# Patient Record
Sex: MALE | Race: WHITE | NOT HISPANIC OR LATINO | Employment: OTHER | URBAN - METROPOLITAN AREA
[De-identification: names, ages, dates, MRNs, and addresses within clinical notes are randomized per-mention and may not be internally consistent; named-entity substitution may affect disease eponyms.]

---

## 2022-11-02 NOTE — TELEPHONE ENCOUNTER
Action 11/2/22 MV 11.06am   Action Taken 1) EMG request faxed to   2) imaging request faxed to , [facility] [place], [facility] and [facility] Fax # [phone]  Tracking # [id number]    11/11/22 MV 12.23pm  All images resolved EXCEPT for CT Head from [facility]. Still need EMG report also    12/7/22 MV 1.54pm  EMG request faxed to     12/20/22 MV 2.30pm  2nd request faxed to  for EMG    12/23/22 MV 1.58pm  EMG rec'd and sent to scanning     Action 11/4/2022 RM   Action Taken Image received via disk from [facility] took to 4n for processing.         RECORDS RECEIVED FROM: self   REASON FOR VISIT: neuropathy    Date of Appt: 1/25/23   NOTES (FOR ALL VISITS) STATUS DETAILS   OFFICE NOTE from other specialist Care Everywhere Dr Bakari Carlson @ [facility] Med:  6/13/22 4/16/21    Dr Tobi Grande @ [facility] Neurology:  7/22/21  6/3/21  5/24/21  5/6/21    Dr Yesica Mijares @ [facility] Neurology:  5/11/18   OPERATIVE REPORT Care Everywhere [facility]:  10/25/21  DECOMPRESSION LEVELS: L2 TO S1 SIDE: BILATERAL   MEDICATION LIST Care Everywhere    IMAGING  (FOR ALL VISITS)     EMG Received [facility] Neurology:  12/5/19  10/11/18   MRI (HEAD, NECK, SPINE) Received [facility]:  MRI Lumbar Spine 4/29/22  MRI Cervical Spine 4/29/22  MRI Thoracic Spine 6/16/21  MRI Cervical Spine 6/16/21  MRI Lumbar Spine 5/9/21    [facility] [place]:  MRI Brain 5/21/18  MRA Head 5/21/18   CT (HEAD, NECK, SPINE) Received [facility]:  CT Head 12/30/21  CT Cervical Spine 12/30/21    [facility]:  CT Head 9/17/21

## 2023-01-25 ENCOUNTER — PRE VISIT (OUTPATIENT)
Dept: NEUROLOGY | Facility: CLINIC | Age: 73
End: 2023-01-25

## 2023-04-17 ENCOUNTER — TRANSCRIBE ORDERS (OUTPATIENT)
Dept: OTHER | Age: 73
End: 2023-04-17

## 2023-04-17 DIAGNOSIS — G62.9 PERIPHERAL NEUROPATHY: ICD-10-CM

## 2023-04-17 DIAGNOSIS — M54.16 CHRONIC LUMBAR RADICULOPATHY: Primary | ICD-10-CM

## 2023-06-09 ENCOUNTER — TRANSFERRED RECORDS (OUTPATIENT)
Dept: HEALTH INFORMATION MANAGEMENT | Facility: CLINIC | Age: 73
End: 2023-06-09
Payer: MEDICARE

## 2023-06-14 ENCOUNTER — TRANSCRIBE ORDERS (OUTPATIENT)
Dept: OTHER | Age: 73
End: 2023-06-14

## 2023-06-14 DIAGNOSIS — G62.9 NEUROPATHY: Primary | ICD-10-CM

## 2023-06-15 ENCOUNTER — TRANSCRIBE ORDERS (OUTPATIENT)
Dept: OTHER | Age: 73
End: 2023-06-15

## 2023-06-15 ENCOUNTER — MEDICAL CORRESPONDENCE (OUTPATIENT)
Dept: HEALTH INFORMATION MANAGEMENT | Facility: CLINIC | Age: 73
End: 2023-06-15
Payer: MEDICARE

## 2023-06-15 DIAGNOSIS — G62.9 PERIPHERAL NEUROPATHY: Primary | ICD-10-CM

## 2023-06-29 ENCOUNTER — TRANSFERRED RECORDS (OUTPATIENT)
Dept: HEALTH INFORMATION MANAGEMENT | Facility: CLINIC | Age: 73
End: 2023-06-29

## 2023-06-29 LAB
HBA1C MFR BLD: 5 %
TSH SERPL-ACNC: 2.69 MIU/L (ref 0.4–4.5)

## 2023-08-04 ENCOUNTER — TRANSFERRED RECORDS (OUTPATIENT)
Dept: HEALTH INFORMATION MANAGEMENT | Facility: CLINIC | Age: 73
End: 2023-08-04

## 2023-08-10 ENCOUNTER — TRANSFERRED RECORDS (OUTPATIENT)
Dept: HEALTH INFORMATION MANAGEMENT | Facility: CLINIC | Age: 73
End: 2023-08-10

## 2023-08-17 ENCOUNTER — MEDICAL CORRESPONDENCE (OUTPATIENT)
Dept: HEALTH INFORMATION MANAGEMENT | Facility: CLINIC | Age: 73
End: 2023-08-17

## 2023-10-06 ENCOUNTER — MEDICAL CORRESPONDENCE (OUTPATIENT)
Dept: HEALTH INFORMATION MANAGEMENT | Facility: CLINIC | Age: 73
End: 2023-10-06

## 2023-10-06 ENCOUNTER — OFFICE VISIT (OUTPATIENT)
Dept: NEUROLOGY | Facility: CLINIC | Age: 73
End: 2023-10-06
Attending: NURSE PRACTITIONER
Payer: COMMERCIAL

## 2023-10-06 VITALS
WEIGHT: 266 LBS | BODY MASS INDEX: 33.07 KG/M2 | DIASTOLIC BLOOD PRESSURE: 83 MMHG | SYSTOLIC BLOOD PRESSURE: 143 MMHG | HEART RATE: 58 BPM | HEIGHT: 75 IN

## 2023-10-06 DIAGNOSIS — R53.1 RAPIDLY PROGRESSIVE WEAKNESS: Primary | ICD-10-CM

## 2023-10-06 DIAGNOSIS — R29.2 AREFLEXIA: ICD-10-CM

## 2023-10-06 PROCEDURE — 99205 OFFICE O/P NEW HI 60 MIN: CPT | Performed by: PSYCHIATRY & NEUROLOGY

## 2023-10-06 RX ORDER — OMEGA-3S/DHA/EPA/FISH OIL/D3 300MG-1000
CAPSULE ORAL
COMMUNITY
Start: 2022-08-12

## 2023-10-06 RX ORDER — PROPRANOLOL HYDROCHLORIDE 20 MG/1
1 TABLET ORAL 2 TIMES DAILY
COMMUNITY
Start: 2022-10-03

## 2023-10-06 RX ORDER — OMEPRAZOLE/SODIUM BICARBONATE 20MG-1.1G
1 CAPSULE ORAL
COMMUNITY
Start: 2022-08-12

## 2023-10-06 RX ORDER — TRAZODONE HYDROCHLORIDE 50 MG/1
1 TABLET, FILM COATED ORAL AT BEDTIME
COMMUNITY
Start: 2022-08-12

## 2023-10-06 RX ORDER — MONTELUKAST SODIUM 10 MG/1
1 TABLET ORAL EVERY EVENING
COMMUNITY
Start: 2022-06-13

## 2023-10-06 RX ORDER — MULTIPLE VITAMINS W/ MINERALS TAB 9MG-400MCG
1 TAB ORAL DAILY
COMMUNITY

## 2023-10-06 RX ORDER — ESCITALOPRAM OXALATE 20 MG/1
20 TABLET ORAL
COMMUNITY
Start: 2022-08-12 | End: 2024-04-29

## 2023-10-06 RX ORDER — GABAPENTIN 300 MG/1
3 CAPSULE ORAL 2 TIMES DAILY
COMMUNITY
Start: 2022-10-03

## 2023-10-06 RX ORDER — GABAPENTIN 300 MG/1
600 CAPSULE ORAL
COMMUNITY
Start: 2022-06-13 | End: 2023-11-17

## 2023-10-06 RX ORDER — FUROSEMIDE 20 MG
1 TABLET ORAL DAILY
COMMUNITY
Start: 2022-10-03 | End: 2024-04-29

## 2023-10-06 RX ORDER — ATORVASTATIN CALCIUM 10 MG/1
1 TABLET, FILM COATED ORAL DAILY
COMMUNITY
Start: 2023-07-03

## 2023-10-06 RX ORDER — NAPHAZOLINE HCL/GLYCERIN 0.012-0.2%
2 DROPS OPHTHALMIC (EYE) DAILY
COMMUNITY

## 2023-10-06 RX ORDER — CETIRIZINE HYDROCHLORIDE 10 MG/1
10 TABLET ORAL DAILY
COMMUNITY

## 2023-10-06 NOTE — NURSING NOTE
"Santo Joyce is a 73 year old male who presents for:  Chief Complaint   Patient presents with    NEUROPATHY     Peripheral Neuropathy   Charcot-Kiley-Tooth disease        Initial Vitals:  BP (!) 143/83   Pulse 58   Ht 1.905 m (6' 3\")   Wt 120.7 kg (266 lb)   BMI 33.25 kg/m   Estimated body mass index is 33.25 kg/m  as calculated from the following:    Height as of this encounter: 1.905 m (6' 3\").    Weight as of this encounter: 120.7 kg (266 lb).. Body surface area is 2.53 meters squared. BP completed using cuff size: wrist cuff    Caesar King  "

## 2023-10-06 NOTE — PROGRESS NOTES
INITIAL NEUROLOGY CONSULTATION    DATE OF VISIT: 10/6/2023  MRN: [id number]  PATIENT NAME: Santo Joyce  YOB: 1950    REFERRING PROVIDER: Marilyn Read    Chief Complaint   Patient presents with    NEUROPATHY     Peripheral Neuropathy   Charcot-Kiley-Tooth disease     SUBJECTIVE:                                                      HPI:   Santo Joyce is a 73 year old male whom I have been asked by Marilyn Read, CNP, to see in consultation for neuropathy.  I note that he had an appointment in neuromuscular clinic with Dr. Sheikh earlier this year but was a no-show and then canceled his appointment here in our clinic in June.  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  Notes also indicate that he is on gabapentin.  B12 level was 811 earlier this year and his TSH was normal (4.2023).  He has additional history of bilateral pitting edema in the lower extremities.  Additional history of chronic lumbar radiculopathy, dyslipidemia, insomnia, sleep apnea and tremor.  He has had decompressive surgery of the lumbar spine in the past.  Transaminase levels were normal earlier this year.  He stopped his statin earlier this year for leg pain. Additional history of Lyme (treated with doxycycline). Family history of ALS in an uncle.     The only neurology note that I can find is a telemedicine visit from May 2021.  The patient reported rapidly progressive weakness in the legs.  He also reported several falls and radiating pain down the fronts of the legs for the previous 2 years.  Plan was for follow-up of a lumbar MRI and EMG.  His gabapentin was increased at that time.    Per the 2021 electromyogram report:  EMG REPORT.    CONCLUSION: Abnormal nerve conduction study (NCS) and EMG of Rt upper and bilateral lower extremities as described in the data sheet. The findings suggest the follows.    1. Severe bilateral tibial and right peroneal motor  neuropathy due to no obtainable responses were noted in these motor nerve studies.    2. Severe right median motor and sensory neuropathy where sensory response is not obtainable and there is likely superimposed carpal tunnel syndrome because of the significant slowing noted at the wrist level and distal to the wrist level which also prolonged when compared to the ulnar nerve distal latency.    3. Severe right ulnar motor and sensory neuropathy where sensory response is not obtainable and there is superimposed decrease nerve conduction velocity at the range of 20-21 m/sec at the inferior aspect of medial epicondyle and across the medial epicondyle regions. The findings suggest that there is likely superimposed right compressive ulnar neuropathy at the elbow level as well.    4. Severe right radial, median, ulnar sensory neuropathy and bilateral medial plantar sensory neuropathy because no obtainable responses were noted in the sensory nerve studies.    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 Of note, exact localization base on paraspinal muscle study is not possible due to the overlapping of enervations in these regions. Also, underling chronic neuropathy make localization difficult if not impossible.     He subsequently followed with orthopedics for the lumbar surgery.    Santo is here with  his SO, Rand.  They explained that he did have a more recent work-up through [facility].  Another EMG was completed this past summer and from that they were told he has neuropathy but otherwise the details are unclear.  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 Over the course of the past couple of years his balance has gotten worse. He has declined rapidly over the past couple of years, per Rand.  They clarified that he had 2 lumbar surgeries in the interim (most recent was a revision of the initial infusion: Dec 2022).  This helped from the standpoint of the thigh pain he was having but has not led to improvement in terms of his strength.     Strength did seem to decline more after he had COVID in August 2021.    He has trouble getting up from a chair. His legs give out from under him.   In terms of the hands, he does have some numbness in digits 4-5 in both hands. He has trouble with dexterity in the fingers.  No bladder or bowel symptoms.  He started to walk with a cane a while ago and he graduated to the walker about a year ago.  He did have a fall a few weeks ago and injured his left knee, tearing the meniscus.  He had a recent cortisone shot in the knee for this.    Santo thinks it may be exercise makes him feel weaker but rest does not help in terms of his strength.  No problems with double vision or neck weakness.  No problems with swallow.    He has not had any brain or upper spine imaging.  He has not undergone a CSF work-up.    Rand mentions that his previous neurologist did tell him that he does not have Parkinson's.    Josh did start with a new physical therapist just today.  He is not sure if therapy has been helpful in terms of his strength and mobility.    No past medical history on file.  No past surgical history on file.    atorvastatin (LIPITOR) 10 MG tablet, Take 1 tablet by mouth daily  cetirizine (ZYRTEC) 10 MG tablet, Take 10  "mg by mouth daily  CINNAMON PO,   escitalopram (LEXAPRO) 20 MG tablet, 20 mg  furosemide (LASIX) 20 MG tablet, Take 1 tablet by mouth daily  gabapentin (NEURONTIN) 300 MG capsule, Take 2 capsules by mouth 3 times daily  Glucosamine-Chondroitin 500-250 MG CAPS, Take 2 tablets by mouth daily  L-Lysine 1000 MG TABS, Take 1,000 mg by mouth  montelukast (SINGULAIR) 10 MG tablet, Take 1 tablet by mouth every evening  multivitamin w/minerals (MULTI-VITAMIN) tablet, Take 1 tablet by mouth daily  omeprazole-sodium bicarbonate  MG CAPS per capsule, Take 1 capsule by mouth daily before breakfast  Probiotic Product (SUPER PROBIOTIC) CAPS, Take 2 capsules by mouth daily  propranolol (INDERAL) 20 MG tablet, Take 1 tablet by mouth 3 times daily  traZODone (DESYREL) 50 MG tablet, Take 1 tablet by mouth at bedtime  Turmeric (QC TUMERIC COMPLEX PO),   Vitamin D3 (VITAMIN D) 10 MCG (400 UNIT) tablet, daily, 0 Refill(s), Type: Maintenance  ZINC METHIONATE PO, Take 1 capsule by mouth daily  gabapentin (NEURONTIN) 300 MG capsule, Take 600 mg by mouth (Patient not taking: Reported on 10/6/2023)    No current facility-administered medications on file prior to visit.    No Known Allergies     No data available          Social History     Tobacco Use    Smoking status: Former     Types: Cigarettes    Smokeless tobacco: Never   Substance Use Topics    Alcohol use: Yes     Comment: 1 drink once a month       REVIEW OF SYSTEMS:                                                      10-point review of systems is negative except as mentioned above in HPI.     EXAM:                                                      Physical Exam:   Vitals: BP (!) 143/83   Pulse 58   Ht 1.905 m (6' 3\")   Wt 120.7 kg (266 lb)   BMI 33.25 kg/m    BMI= Body mass index is 33.25 kg/m .  GENERAL: NAD.  HEENT: NC/AT.   CV: RRR. S1, S2.   NECK: No bruits.  PULM: Non-labored breathing.   EXTR: Mild pitting edema distal to mid calves.  Neurologic:  MENTAL STATUS: " Alert, attentive. Speech is fluent. Normal comprehension. Normal concentration. Adequate fund of knowledge.   CRANIAL NERVES: Discs technically difficult to visualize. Visual fields intact to confrontation. Pupils equally, round and reactive to light. Facial sensation and movement normal. EOM full. Hearing intact to conversation. Sternocleidomastoids and trapezius strength intact. Palate moves symmetrically. Tongue midline.  MOTOR: Strength appears to be full in the upper extremities.  Some slight weakness with hip flexion on the left. AB/ADduction strength is normal.  Slight weakness bilaterally with knee extension.  Knee flexion is good bilaterally.  Evidence of foot drop on the right and very slight weakness in dorsiflexion on the left.  His right leg is slightly thinner than the left (not new, per SO).  Some atrophy in the hands.  DTRs: Mute.    SENSATION: Normal light touch and pinprick in the hands except for some hypersensitivity in the right pinky.  Absent pinprick in the legs distal to the knees. Vibration: Absent at right ankle, severely diminished on the left (1 second).   COORDINATION: Slight tremor (L>R) otherwise normal finger nose finger. Finger tapping is slow, he cannot put his thumb and pinkys together.  Knee heel shin requires a great deal of effort bilaterally.  STATION AND GAIT: Cannot test Romberg or postural reflexes due to unsteadiness on his feet.  He cannot get up from the chair without using his arms.  Gait is wide-based, cautious and steppage on the right.  Left hand-dominant.    ASSESSMENT and PLAN:                                                      Assessment:     ICD-10-CM    1. Rapidly progressive weakness  R53.1 MR Brain w/o & w Contrast     MR Cervical Spine w/o & w Contrast     MR Thoracic Spine w/o & w Contrast      2. Peripheral neuropathy  G62.9 Adult Neurology  Referral      3. Areflexia  R29.2           Mr. Joyce is a pleasant 73-year-old man here for a second  opinion regarding progressive weakness/paresthesias.  He is areflexic and does have some variable weakness in the legs.  Possible demyelinating neuropathy and I do not have the recent EMG results nor the results of the additional work-up completed through [facility] so we have requested these. I would like to do central nervous system imaging since this has not been completed.  Additional work-up pending the results of his work-up this past summer.  I will notify the patient regarding next steps.  Santo and Rand expressed understanding and agreement with the plan.    Plan:  -- I would like to start with brain and cervical/thoracic spine imaging.  In the meantime we will get your records from [facility] and  -- Once I review these I will let you know what next steps I recommend.  -- Follow up in clinic when you return to [place] in the spring.   -- Continue to physical therapy in the meantime.    Total Time: 70 minutes were spent with the patient and in chart review/documentation (as described above in Assessment and Plan) /coordinating the care on date of service.    Deanne Monroe MD  Neurology    CC: GUERO Varghese software used in the dictation of this note.

## 2023-10-06 NOTE — LETTER
10/6/2023         RE: Santo Joyce  [address] [facility] [address]        Dear Colleague,    Thank you for referring your patient, Santo Joyce, to the [facility] [place]. Please see a copy of my visit note below.    INITIAL NEUROLOGY CONSULTATION    DATE OF VISIT: 10/6/2023  MRN: [id number]  PATIENT NAME: Santo Joyce  YOB: 1950    REFERRING PROVIDER: Marilyn Read    Chief Complaint   Patient presents with     NEUROPATHY     Peripheral Neuropathy   Charcot-Kiley-Tooth disease     SUBJECTIVE:                                                      HPI:   Santo Joyce is a 73 year old male whom I have been asked by Marilyn Read, CNP, to see in consultation for neuropathy.  I note that he had an appointment in neuromuscular clinic with Dr. Sheikh earlier this year but was a no-show and then canceled his appointment here in our clinic in June.  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  Notes also indicate that he is on gabapentin.  B12 level was 811 earlier this year and his TSH was normal (4.2023).  He has additional history of bilateral pitting edema in the lower extremities.  Additional history of chronic lumbar radiculopathy, dyslipidemia, insomnia, sleep apnea and tremor.  He has had decompressive surgery of the lumbar spine in the past.  Transaminase levels were normal earlier this year.  He stopped his statin earlier this year for leg pain. Additional history of Lyme (treated with doxycycline). Family history of ALS in an uncle.     The only neurology note that I can find is a telemedicine visit from May 2021.  The patient reported rapidly progressive weakness in the legs.  He also reported several falls and radiating pain down the fronts of the legs for the previous 2 years.  Plan was for follow-up of a lumbar MRI and EMG.  His gabapentin was increased at that time.    Per the 2021  electromyogram report:  EMG REPORT.    CONCLUSION: Abnormal nerve conduction study (NCS) and EMG of Rt upper and bilateral lower extremities as described in the data sheet. The findings suggest the follows.    1. Severe bilateral tibial and right peroneal motor neuropathy due to no obtainable responses were noted in these motor nerve studies.    2. Severe right median motor and sensory neuropathy where sensory response is not obtainable and there is likely superimposed carpal tunnel syndrome because of the significant slowing noted at the wrist level and distal to the wrist level which also prolonged when compared to the ulnar nerve distal latency.    3. Severe right ulnar motor and sensory neuropathy where sensory response is not obtainable and there is superimposed decrease nerve conduction velocity at the range of 20-21 m/sec at the inferior aspect of medial epicondyle and across the medial epicondyle regions. The findings suggest that there is likely superimposed right compressive ulnar neuropathy at the elbow level as well.    4. Severe right radial, median, ulnar sensory neuropathy and bilateral medial plantar sensory neuropathy because no obtainable responses were noted in the sensory nerve studies.    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 Of note, exact localization base on  paraspinal muscle study is not possible due to the overlapping of enervations in these regions. Also, underling chronic neuropathy make localization difficult if not impossible.     He subsequently followed with orthopedics for the lumbar surgery.    Santo is here with his SO, Rand.  They explained that he did have a more recent work-up through [facility].  Another EMG was completed this past summer and from that they were told he has neuropathy but otherwise the details are unclear.  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 Over the course of the past couple of years his balance has gotten worse. He has declined rapidly over the past couple of years, per Rand.  They clarified that he had 2 lumbar surgeries in the interim (most recent was a revision of the initial infusion: Dec 2022).  This helped from the standpoint of the thigh pain he was having but has not led to improvement in terms of his strength.     Strength did seem to decline more after he had COVID in August 2021.    He has trouble getting up from a chair. His legs give out from under him.   In terms of the hands, he does have some numbness in digits 4-5 in both hands. He has trouble with dexterity in the fingers.  No bladder or bowel symptoms.  He started to walk with a cane a while ago and he graduated to the walker about a year ago.  He did have a fall a few weeks ago and injured his left knee, tearing the meniscus.  He had a recent cortisone shot in the knee for this.    Santo thinks it may be exercise makes him feel weaker but rest does not help in terms of his strength.  No problems with double vision or neck weakness.  No problems with swallow.    He has not had any brain or upper spine imaging.  He has not undergone a CSF work-up.    Rand mentions that his previous neurologist did tell him that he does not have Parkinson's.    Josh did start with a new physical therapist just  today.  He is not sure if therapy has been helpful in terms of his strength and mobility.    No past medical history on file.  No past surgical history on file.    atorvastatin (LIPITOR) 10 MG tablet, Take 1 tablet by mouth daily  cetirizine (ZYRTEC) 10 MG tablet, Take 10 mg by mouth daily  CINNAMON PO,   escitalopram (LEXAPRO) 20 MG tablet, 20 mg  furosemide (LASIX) 20 MG tablet, Take 1 tablet by mouth daily  gabapentin (NEURONTIN) 300 MG capsule, Take 2 capsules by mouth 3 times daily  Glucosamine-Chondroitin 500-250 MG CAPS, Take 2 tablets by mouth daily  L-Lysine 1000 MG TABS, Take 1,000 mg by mouth  montelukast (SINGULAIR) 10 MG tablet, Take 1 tablet by mouth every evening  multivitamin w/minerals (MULTI-VITAMIN) tablet, Take 1 tablet by mouth daily  omeprazole-sodium bicarbonate  MG CAPS per capsule, Take 1 capsule by mouth daily before breakfast  Probiotic Product (SUPER PROBIOTIC) CAPS, Take 2 capsules by mouth daily  propranolol (INDERAL) 20 MG tablet, Take 1 tablet by mouth 3 times daily  traZODone (DESYREL) 50 MG tablet, Take 1 tablet by mouth at bedtime  Turmeric (QC TUMERIC COMPLEX PO),   Vitamin D3 (VITAMIN D) 10 MCG (400 UNIT) tablet, daily, 0 Refill(s), Type: Maintenance  ZINC METHIONATE PO, Take 1 capsule by mouth daily  gabapentin (NEURONTIN) 300 MG capsule, Take 600 mg by mouth (Patient not taking: Reported on 10/6/2023)    No current facility-administered medications on file prior to visit.    No Known Allergies     No data available          Social History     Tobacco Use     Smoking status: Former     Types: Cigarettes     Smokeless tobacco: Never   Substance Use Topics     Alcohol use: Yes     Comment: 1 drink once a month       REVIEW OF SYSTEMS:                                                      10-point review of systems is negative except as mentioned above in HPI.     EXAM:                                                      Physical Exam:   Vitals: BP (!) 143/83   Pulse 58    "Ht 1.905 m (6' 3\")   Wt 120.7 kg (266 lb)   BMI 33.25 kg/m    BMI= Body mass index is 33.25 kg/m .  GENERAL: NAD.  HEENT: NC/AT.   CV: RRR. S1, S2.   NECK: No bruits.  PULM: Non-labored breathing.   EXTR: Mild pitting edema distal to mid calves.  Neurologic:  MENTAL STATUS: Alert, attentive. Speech is fluent. Normal comprehension. Normal concentration. Adequate fund of knowledge.   CRANIAL NERVES: Discs technically difficult to visualize. Visual fields intact to confrontation. Pupils equally, round and reactive to light. Facial sensation and movement normal. EOM full. Hearing intact to conversation. Sternocleidomastoids and trapezius strength intact. Palate moves symmetrically. Tongue midline.  MOTOR: Strength appears to be full in the upper extremities.  Some slight weakness with hip flexion on the left. AB/ADduction strength is normal.  Slight weakness bilaterally with knee extension.  Knee flexion is good bilaterally.  Evidence of foot drop on the right and very slight weakness in dorsiflexion on the left.  His right leg is slightly thinner than the left (not new, per SO).  Some atrophy in the hands.  DTRs: Mute.    SENSATION: Normal light touch and pinprick in the hands except for some hypersensitivity in the right pinky.  Absent pinprick in the legs distal to the knees. Vibration: Absent at right ankle, severely diminished on the left (1 second).   COORDINATION: Slight tremor (L>R) otherwise normal finger nose finger. Finger tapping is slow, he cannot put his thumb and pinkys together.  Knee heel shin requires a great deal of effort bilaterally.  STATION AND GAIT: Cannot test Romberg or postural reflexes due to unsteadiness on his feet.  He cannot get up from the chair without using his arms.  Gait is wide-based, cautious and steppage on the right.  Left hand-dominant.    ASSESSMENT and PLAN:                                                      Assessment:     ICD-10-CM    1. Rapidly progressive weakness  " R53.1 MR Brain w/o & w Contrast     MR Cervical Spine w/o & w Contrast     MR Thoracic Spine w/o & w Contrast      2. Peripheral neuropathy  G62.9 Adult Neurology  Referral      3. Areflexia  R29.2           Mr. Joyce is a pleasant 73-year-old man here for a second opinion regarding progressive weakness/paresthesias.  He is areflexic and does have some variable weakness in the legs.  Possible demyelinating neuropathy and I do not have the recent EMG results nor the results of the additional work-up completed through [facility] so we have requested these. I would like to do central nervous system imaging since this has not been completed.  Additional work-up pending the results of his work-up this past summer.  I will notify the patient regarding next steps.  Santo and Rand expressed understanding and agreement with the plan.    Plan:  -- I would like to start with brain and cervical/thoracic spine imaging.  In the meantime we will get your records from [facility] and  -- Once I review these I will let you know what next steps I recommend.  -- Follow up in clinic when you return to [place] in the spring.   -- Continue to physical therapy in the meantime.    Total Time: 70 minutes were spent with the patient and in chart review/documentation (as described above in Assessment and Plan) /coordinating the care on date of service.    Deanne Monroe MD  Neurology    CC: GUERO Varghe[employer] software used in the dictation of this note.      Again, thank you for allowing me to participate in the care of your patient.        Sincerely,        Deanne Monroe MD

## 2023-10-27 ENCOUNTER — HOSPITAL ENCOUNTER (OUTPATIENT)
Dept: MRI IMAGING | Facility: HOSPITAL | Age: 73
Discharge: HOME | End: 2023-10-27
Attending: PSYCHIATRY & NEUROLOGY
Payer: MEDICARE

## 2023-10-27 DIAGNOSIS — R53.1 RAPIDLY PROGRESSIVE WEAKNESS: ICD-10-CM

## 2023-10-27 PROCEDURE — A9585 GADOBUTROL INJECTION: HCPCS | Mod: JZ | Performed by: PSYCHIATRY & NEUROLOGY

## 2023-10-27 PROCEDURE — 72157 MRI CHEST SPINE W/O & W/DYE: CPT

## 2023-10-27 PROCEDURE — 70553 MRI BRAIN STEM W/O & W/DYE: CPT

## 2023-10-27 PROCEDURE — 72156 MRI NECK SPINE W/O & W/DYE: CPT

## 2023-10-27 PROCEDURE — 255N000002 HC RX 255 OP 636: Mod: JZ | Performed by: PSYCHIATRY & NEUROLOGY

## 2023-10-27 RX ORDER — GADOBUTROL 604.72 MG/ML
0.1 INJECTION INTRAVENOUS ONCE
Status: COMPLETED | OUTPATIENT
Start: 2023-10-27 | End: 2023-10-27

## 2023-10-27 RX ADMIN — GADOBUTROL 12 ML: 604.72 INJECTION INTRAVENOUS at 18:35

## 2023-11-01 ENCOUNTER — TRANSFERRED RECORDS (OUTPATIENT)
Dept: HEALTH INFORMATION MANAGEMENT | Facility: CLINIC | Age: 73
End: 2023-11-01

## 2023-11-17 ENCOUNTER — HOSPITAL ENCOUNTER (OUTPATIENT)
Dept: RADIOLOGY | Facility: HOSPITAL | Age: 73
Discharge: HOME | End: 2023-11-17
Attending: NURSE PRACTITIONER | Admitting: NURSE PRACTITIONER
Payer: MEDICARE

## 2023-11-17 ENCOUNTER — OFFICE VISIT (OUTPATIENT)
Dept: PHYSICAL MEDICINE AND REHAB | Facility: CLINIC | Age: 73
End: 2023-11-17
Attending: PSYCHIATRY & NEUROLOGY
Payer: COMMERCIAL

## 2023-11-17 VITALS
WEIGHT: 262 LBS | BODY MASS INDEX: 32.58 KG/M2 | SYSTOLIC BLOOD PRESSURE: 153 MMHG | HEART RATE: 76 BPM | DIASTOLIC BLOOD PRESSURE: 70 MMHG | HEIGHT: 75 IN

## 2023-11-17 DIAGNOSIS — M43.12 SPONDYLOLISTHESIS OF CERVICAL REGION: ICD-10-CM

## 2023-11-17 DIAGNOSIS — M50.30 DDD (DEGENERATIVE DISC DISEASE), CERVICAL: ICD-10-CM

## 2023-11-17 DIAGNOSIS — M43.12 SPONDYLOLISTHESIS OF CERVICAL REGION: Primary | ICD-10-CM

## 2023-11-17 PROCEDURE — 72040 X-RAY EXAM NECK SPINE 2-3 VW: CPT

## 2023-11-17 PROCEDURE — 99204 OFFICE O/P NEW MOD 45 MIN: CPT | Performed by: NURSE PRACTITIONER

## 2023-11-17 ASSESSMENT — PAIN SCALES - GENERAL: PAINLEVEL: MILD PAIN (2)

## 2023-11-17 NOTE — LETTER
11/17/2023         RE: Santo Joyce  [address] [facility] [address]        Dear Colleague,    Thank you for referring your patient, Santo Joyce, to the [facility]ERY. Please see a copy of my visit note below.    ASSESSMENT: Santo Joyce is a 73 year old male presents for consultation at the request of PCP Bakari Carlson, who presents today for new patient evaluation of :     -Cervical radiculopathy    Patient has weakness in his hands, and sensory loss in his hands on exam.  He has chronic distal bilateral lower extremity weakness, which was present prior to his lumbar surgeries.  He is hoping to attempt to preserve his hand function.  We reviewed his cervical MRI study, which does show multilevel severe bilateral neural foraminal narrowing in the cervical spine.  We reviewed his thoracic MRI, which appears normal with wear and tear degenerative disc changes he reportedly had an EMG of both upper extremities in June of this year, however I do not have access to this report.  We we will contact [facility] and have these results pushed to our system for review.  His previous EMGs show carpal tunnel and ulnar neuropathy, as well as polyneuropathy.  I would anticipate a neurosurgery referral given his hand weakness and numbness, and recommended adding cervical flexion-extension x-rays today.  We will touch base after we have EMG results           No data to display                     Diagnoses and all orders for this visit:  Spondylolisthesis of cervical region  -     XR Cervical Spine Flex/Ext 2/3 Views; Future  DDD (degenerative disc disease), cervical  -     Spine  Referral       PLAN:  Reviewed spine anatomy and disease process. Discussed diagnosis and treatment options with the patient today. A shared decision making model was used. The patient's values and choices were respected. The following represents what was discussed and decided upon by the provider and the patient.      -DIAGNOSTIC TESTS:  Images were personally reviewed and interpreted and explained to patient today using spine model.   -- I ordered cervical flexion-extension x-rays    -PHYSICAL THERAPY:    -Patient is already in physical therapy, and will continue this  Discussed the importance of core strengthening, ROM, stretching exercises with the patient and how each of these entities is important in decreasing pain.  Explained to the patient that the purpose of physical therapy is to teach the patient a home exercise program.  These exercises need to be performed every day in order to decrease pain and prevent future occurrences of pain.    -MEDICATIONS:    -No medication changes were made today.  Agree with gabapentin for his hand numbness.  As he is not having much pain, would not add any additional medications to this plan  Discussed multiple medication options today with patient. Discussed risks, side effects, and proper use of medications. Patient verbalized understanding.    -INTERVENTIONS:    We did not discuss injections today    -PATIENT EDUCATION: Total time of 40 minutes, on the day of service, spent with the patient, reviewing the chart, placing orders, and documenting.   -Today we also discussed the issues related to the pros and cons of the current treatment plan.    -FOLLOW-UP:   Follow-up telephone call to review EMG and x-ray results    Advised patient to call the [facility] if symptoms worsen or if they develop red flag symptoms such as numbness, weakness, severe pain uncontrolled by current pain med regimen, or any new or worsening problems controlling bladder and bowel function.   ______________________________________________________________________    SUBJECTIVE:   Santo Joyce  is a 73 year old male history of depression, neuropathy, CMT and 2 previous lumbar spine surgeries who presents today for new patient evaluation of cervical radiculopathy    Patient was referred by neurology for further  evaluation of bilateral hand numbness, tingling in the setting of recent cervical MRI results.  Santo reports chronic numbness and tingling in both of his hands, particularly in the thumb and index finger, and fourth and fifth fingers.  He reports progressing weakness in his hands and also legs.  He denies any neck or lower back pain, radicular arm or leg pain, or changes in bowel or bladder control. He has been walking with a rolling walker for the last year or so.  He did have bilateral foot drop prior to his lumbar surgery. he has had lumbar surgery x2 and was cleared of any further surgical needs based on repeat lumbar imaging recently.  He is followed by neurology and was diagnosed with CMT. neurology team has discussed possible LP for further work-up, but this has not been pursued.  He was recommended to have MRI imaging of his brain, cervical spine, thoracic spine and has not been able to discuss results yet.  Neurology was reportedly waiting for EMG results to be pushed to the system as well.  He had a bilateral upper extremity EMG in June 2023 done at [facility]    He has not had any cervical spine surgery, injections, or chiropractic care  He has had 2 lumbar spine surgeries      -Treatment to Date:     -Medications:  gabapentin    Current Outpatient Medications   Medication     atorvastatin (LIPITOR) 10 MG tablet     cetirizine (ZYRTEC) 10 MG tablet     CINNAMON PO     escitalopram (LEXAPRO) 20 MG tablet     furosemide (LASIX) 20 MG tablet     gabapentin (NEURONTIN) 300 MG capsule     Glucosamine-Chondroitin 500-250 MG CAPS     L-Lysine 1000 MG TABS     montelukast (SINGULAIR) 10 MG tablet     multivitamin w/minerals (MULTI-VITAMIN) tablet     omeprazole-sodium bicarbonate  MG CAPS per capsule     Probiotic Product (SUPER PROBIOTIC) CAPS     propranolol (INDERAL) 20 MG tablet     traZODone (DESYREL) 50 MG tablet     Turmeric (QC TUMERIC COMPLEX PO)     Vitamin D3 (VITAMIN D) 10 MCG (400 UNIT) tablet  "    ZINC METHIONATE PO     No current facility-administered medications for this visit.       No Known Allergies    No past medical history on file.     There is no problem list on file for this patient.      No past surgical history on file.    No family history on file.    Reviewed past medical, surgical, and family history with patient found on new patient intake packet located in EMR Media tab.     SOCIAL HX: Non-smoker, no alcohol use, no heavy drinking, no recreational drug use    Oswestry (JAKY) Questionnaire:         No data to display                Neck Disability Index:       No data to display                   PHQ-2 Score:       10/6/2023    10:52 AM   PHQ-2 ( 1999 [employer])   Q1: Little interest or pleasure in doing things 0   Q2: Feeling down, depressed or hopeless 0   PHQ-2 Score 0          ROS: Positive for sexual dysfunction, ringing in ears, feet and leg swelling, color changes in hands and feet, shortness of breath, wheezing, diarrhea, nausea vomiting, joint pain, muscle pain, muscle fatigue, imbalance, falls, easy bruising, depression specifically negative for bowel/bladder dysfunction, balance changes, headache, dizziness, foot drop, fevers, chills, appetite changes, nausea/vomiting, unexplained weight loss. Otherwise 13 systems reviewed are negative. Please see the patient's intake questionnaire from today for details.    OBJECTIVE:  BP (!) 153/70   Pulse 76   Ht 6' 3\" (1.905 m)   Wt 262 lb (118.8 kg)   BMI 32.75 kg/m      PHYSICAL EXAMINATION:  --CONSTITUTIONAL: Vital signs as above. No acute distress. The patient is well nourished and well groomed.  --PSYCHIATRIC: The patient is awake, alert, oriented to person, place, and time, and answering questions appropriately with clear speech. Appropriate mood and affect   --HEENT: Sclera are non-injected. Extraocular muscles are intact. Moist oral mucosa.  --SKIN: Skin over the face, bilateral upper extremities, and posterior torso is clean, dry, " intact without rashes.  --RESPIRATORY: Normal rhythm and effort. No abnormal accessory muscle breathing patterns noted.     --GROSS MOTOR: Arises with difficulty from seated position, uses rolling walker to shuffle to table    --UPPER EXTREMITY MOTOR TESTING:  Shoulder abduction: right 5/5, left 5/5  Triceps: right 5/5 left 5/5  Biceps:right 5/5  left 5/5,   Hand :  right 5/5  left 5/5,   Intrinsics: right 4-/5  left 4-/5,   Extensors: right 4-/5  left 4-/5,     --LOWER EXTREMITY MOTOR TESTING  Hip flexion: right 5/5  left 5/5,   Quads:right 5/5  left 5/5,   Hamstrings: right 5/5  left 5/5,   Dorsiflexion, plantarflexion, EHL N/A due to braces present    REFLEXES: 0/4 symmetric triceps, biceps, brachioradialis bilaterally. 0/4 symmetric patellar, achilles reflex bilaterally.  Negative  Hernandez's bilaterally.      SENSATION: of the upper and lower extremities is decreased to light touch brandie 1-2, 4-5 fingers. Sensation of lower extremities is grossly intact    He is wearing bilateral AFO braces    --VASCULAR: Warm upper and lower limbs bilaterally.     --CERVICAL SPINE: Inspection reveals no evidence of deformity or swelling. Range of motion is not limited in cervical flexion, extension, lateral rotation, head tilt. No tenderness to palpation of traps, scaps, or paraspinal musculature.    Left dorsal hand is swollen and somewhat tender consistent with reported fracture within the last week    RESULTS:   Prior medical records from [facility] were reviewed today.         IMAGING:  Spine imaging was personally reviewed and interpreted today. The images were shown to the patient and the findings were explained using a spine model.       MR Thoracic Spine w/o & w Contrast    Result Date: 10/28/2023  EXAM: MR THORACIC SPINE W/O and W CONTRAST, MR CERVICAL SPINE W/O and W CONTRAST, MR BRAIN W/O and W CONTRAST LOCATION: [facility] DATE: 10/27/2023 INDICATION:  Rapidly  progressive weakness COMPARISON: None. CONTRAST: Gadavist 12 mL TECHNIQUE: 1.  Routine MRI brain without and with IV contrast. 2.  Routine cervical spine MRI without and with IV contrast. 3.  Routine Thoracic Spine MRI without and with IV contrast. FINDINGS: BRAIN MRI: No abnormal restricted diffusion to suggest acute infarct. Few scattered foci of signal abnormality within the cerebral hemispheric white matter which are nonspecific, though most commonly ascribed to chronic small vessel ischemic disease. The ventricles  and sulci are prominent consistent with mild brain parenchymal volume loss. No evidence of acute intracranial hemorrhage, mass effect, or extra-axial collection. No evidence of abnormal brain parenchymal or leptomeningeal enhancement. No cerebellar tonsillar ectopia. Expected signal voids within the distal vertebral, basilar, and bilateral internal carotid arteries. The globes are unremarkable. The partially imaged paranasal sinuses are unremarkable. Bilateral mastoid effusions. The visualized skull base and calvarium are unremarkable. CERVICAL SPINE MRI: Anterolisthesis of C3 on C4 measuring 3 mm and C4 on C5 measuring 4 mm. C7 on T1 measuring 2 mm and T1-T2 measuring 2 mm. Anterior marginal osteophytes at C5/C6 and C6/C7. The vertebral bodies of the cervical spine otherwise have normal stature, alignment, and marrow signal intensity. No evidence of signal abnormality, expansion, or enhancement within the cervical spinal cord. C2/C3:  Mild loss of disc signal and disc height. No posterior disc bulge or spinal canal narrowing. Uncovertebral joint disease and facet arthropathy with moderate right neural foraminal narrowing. No left neural foraminal narrowing. C3/C4:  Mild loss of disc signal and disc height. No posterior disc bulge or spinal canal narrowing. Uncovertebral joint disease and facet arthropathy with severe bilateral neural foraminal narrowing. C4/C5:  Anterolisthesis of C4 on C5 measuring  4 mm. No posterior disc bulge or spinal canal narrowing. Uncovertebral joint disease and facet arthropathy with severe bilateral neural foraminal narrowing. C5/C6:  Broad bar disc osteophyte complex with mild spinal canal narrowing. Uncovertebral joint disease and facet arthropathy with severe bilateral neural foraminal narrowing.  C6/C7:  Broad bar disc osteophyte complex with mild spinal canal narrowing. Uncovertebral joint disease and facet arthropathy with moderate to severe bilateral neural foraminal narrowing C7/T1: No posterior disc bulge or spinal canal narrowing. No neural foraminal narrowing. THORACIC SPINE MRI: Moderate thoracic kyphosis. Focal fat or hemangioma at T8. Anterior marginal osteophytes at T1/T2 - T11/T12. Small endplate Schmorl's nodes at T3/T4 - T10/T11. The vertebral bodies of the thoracic spine otherwise have normal stature, alignment, and marrow signal intensity. No evidence of signal abnormality or expansion within the thoracic spinal cord. No abnormal contrast enhancement within the terminal spinal cord or cauda equina nerve roots. No significant posterior disc bulge, spinal canal, or neural foraminal narrowing at any level within the thoracic spine.     IMPRESSION: Brain MRI: 1.  No evidence of acute intracranial hemorrhage, mass effect, or infarction. 2.  Mild nonspecific white matter changes. 3.  Mild brain parenchymal volume loss. Bilateral mastoid effusions. Cervical Spine MRI: 1.  At the C5/C6 and C6/C7 levels, there are broad bar disc osteophyte complexes which contribute to mild spinal canal narrowing. 2.  No significant posterior disc bulge or spinal canal narrowing at any other level within the cervical spine. 3.  Multilevel uncovertebral joint disease and facet arthropathy with severe multilevel neural foraminal narrowing at C3/C4 - C6/C7 as described above. Thoracic Spine MRI: 1.  No evidence of signal abnormality or expansion within the thoracic spinal cord. 2.  No  significant posterior disc bulge, spinal canal, or neural foraminal narrowing at any level within the thoracic spine.      MR Cervical Spine w/o & w Contrast    Result Date: 10/28/2023  EXAM: MR THORACIC SPINE W/O and W CONTRAST, MR CERVICAL SPINE W/O and W CONTRAST, MR BRAIN W/O and W CONTRAST LOCATION: [facility] DATE: 10/27/2023 INDICATION:  Rapidly progressive weakness COMPARISON: None. CONTRAST: Gadavist 12 mL TECHNIQUE: 1.  Routine MRI brain without and with IV contrast. 2.  Routine cervical spine MRI without and with IV contrast. 3.  Routine Thoracic Spine MRI without and with IV contrast. FINDINGS: BRAIN MRI: No abnormal restricted diffusion to suggest acute infarct. Few scattered foci of signal abnormality within the cerebral hemispheric white matter which are nonspecific, though most commonly ascribed to chronic small vessel ischemic disease. The ventricles  and sulci are prominent consistent with mild brain parenchymal volume loss. No evidence of acute intracranial hemorrhage, mass effect, or extra-axial collection. No evidence of abnormal brain parenchymal or leptomeningeal enhancement. No cerebellar tonsillar ectopia. Expected signal voids within the distal vertebral, basilar, and bilateral internal carotid arteries. The globes are unremarkable. The partially imaged paranasal sinuses are unremarkable. Bilateral mastoid effusions. The visualized skull base and calvarium are unremarkable. CERVICAL SPINE MRI: Anterolisthesis of C3 on C4 measuring 3 mm and C4 on C5 measuring 4 mm. C7 on T1 measuring 2 mm and T1-T2 measuring 2 mm. Anterior marginal osteophytes at C5/C6 and C6/C7. The vertebral bodies of the cervical spine otherwise have normal stature, alignment, and marrow signal intensity. No evidence of signal abnormality, expansion, or enhancement within the cervical spinal cord. C2/C3:  Mild loss of disc signal and disc height. No posterior disc bulge or spinal canal narrowing.  Uncovertebral joint disease and facet arthropathy with moderate right neural foraminal narrowing. No left neural foraminal narrowing. C3/C4:  Mild loss of disc signal and disc height. No posterior disc bulge or spinal canal narrowing. Uncovertebral joint disease and facet arthropathy with severe bilateral neural foraminal narrowing. C4/C5:  Anterolisthesis of C4 on C5 measuring 4 mm. No posterior disc bulge or spinal canal narrowing. Uncovertebral joint disease and facet arthropathy with severe bilateral neural foraminal narrowing. C5/C6:  Broad bar disc osteophyte complex with mild spinal canal narrowing. Uncovertebral joint disease and facet arthropathy with severe bilateral neural foraminal narrowing.  C6/C7:  Broad bar disc osteophyte complex with mild spinal canal narrowing. Uncovertebral joint disease and facet arthropathy with moderate to severe bilateral neural foraminal narrowing C7/T1: No posterior disc bulge or spinal canal narrowing. No neural foraminal narrowing. THORACIC SPINE MRI: Moderate thoracic kyphosis. Focal fat or hemangioma at T8. Anterior marginal osteophytes at T1/T2 - T11/T12. Small endplate Schmorl's nodes at T3/T4 - T10/T11. The vertebral bodies of the thoracic spine otherwise have normal stature, alignment, and marrow signal intensity. No evidence of signal abnormality or expansion within the thoracic spinal cord. No abnormal contrast enhancement within the terminal spinal cord or cauda equina nerve roots. No significant posterior disc bulge, spinal canal, or neural foraminal narrowing at any level within the thoracic spine.     IMPRESSION: Brain MRI: 1.  No evidence of acute intracranial hemorrhage, mass effect, or infarction. 2.  Mild nonspecific white matter changes. 3.  Mild brain parenchymal volume loss. Bilateral mastoid effusions. Cervical Spine MRI: 1.  At the C5/C6 and C6/C7 levels, there are broad bar disc osteophyte complexes which contribute to mild spinal canal narrowing. 2.   No significant posterior disc bulge or spinal canal narrowing at any other level within the cervical spine. 3.  Multilevel uncovertebral joint disease and facet arthropathy with severe multilevel neural foraminal narrowing at C3/C4 - C6/C7 as described above. Thoracic Spine MRI: 1.  No evidence of signal abnormality or expansion within the thoracic spinal cord. 2.  No significant posterior disc bulge, spinal canal, or neural foraminal narrowing at any level within the thoracic spine.    MR Brain w/o & w Contrast    Result Date: 10/28/2023  EXAM: MR THORACIC SPINE W/O and W CONTRAST, MR CERVICAL SPINE W/O and W CONTRAST, MR BRAIN W/O and W CONTRAST LOCATION: [facility] DATE: 10/27/2023 INDICATION:  Rapidly progressive weakness COMPARISON: None. CONTRAST: Gadavist 12 mL TECHNIQUE: 1.  Routine MRI brain without and with IV contrast. 2.  Routine cervical spine MRI without and with IV contrast. 3.  Routine Thoracic Spine MRI without and with IV contrast. FINDINGS: BRAIN MRI: No abnormal restricted diffusion to suggest acute infarct. Few scattered foci of signal abnormality within the cerebral hemispheric white matter which are nonspecific, though most commonly ascribed to chronic small vessel ischemic disease. The ventricles  and sulci are prominent consistent with mild brain parenchymal volume loss. No evidence of acute intracranial hemorrhage, mass effect, or extra-axial collection. No evidence of abnormal brain parenchymal or leptomeningeal enhancement. No cerebellar tonsillar ectopia. Expected signal voids within the distal vertebral, basilar, and bilateral internal carotid arteries. The globes are unremarkable. The partially imaged paranasal sinuses are unremarkable. Bilateral mastoid effusions. The visualized skull base and calvarium are unremarkable. CERVICAL SPINE MRI: Anterolisthesis of C3 on C4 measuring 3 mm and C4 on C5 measuring 4 mm. C7 on T1 measuring 2 mm and T1-T2 measuring 2  mm. Anterior marginal osteophytes at C5/C6 and C6/C7. The vertebral bodies of the cervical spine otherwise have normal stature, alignment, and marrow signal intensity. No evidence of signal abnormality, expansion, or enhancement within the cervical spinal cord. C2/C3:  Mild loss of disc signal and disc height. No posterior disc bulge or spinal canal narrowing. Uncovertebral joint disease and facet arthropathy with moderate right neural foraminal narrowing. No left neural foraminal narrowing. C3/C4:  Mild loss of disc signal and disc height. No posterior disc bulge or spinal canal narrowing. Uncovertebral joint disease and facet arthropathy with severe bilateral neural foraminal narrowing. C4/C5:  Anterolisthesis of C4 on C5 measuring 4 mm. No posterior disc bulge or spinal canal narrowing. Uncovertebral joint disease and facet arthropathy with severe bilateral neural foraminal narrowing. C5/C6:  Broad bar disc osteophyte complex with mild spinal canal narrowing. Uncovertebral joint disease and facet arthropathy with severe bilateral neural foraminal narrowing.  C6/C7:  Broad bar disc osteophyte complex with mild spinal canal narrowing. Uncovertebral joint disease and facet arthropathy with moderate to severe bilateral neural foraminal narrowing C7/T1: No posterior disc bulge or spinal canal narrowing. No neural foraminal narrowing. THORACIC SPINE MRI: Moderate thoracic kyphosis. Focal fat or hemangioma at T8. Anterior marginal osteophytes at T1/T2 - T11/T12. Small endplate Schmorl's nodes at T3/T4 - T10/T11. The vertebral bodies of the thoracic spine otherwise have normal stature, alignment, and marrow signal intensity. No evidence of signal abnormality or expansion within the thoracic spinal cord. No abnormal contrast enhancement within the terminal spinal cord or cauda equina nerve roots. No significant posterior disc bulge, spinal canal, or neural foraminal narrowing at any level within the thoracic spine.      IMPRESSION: Brain MRI: 1.  No evidence of acute intracranial hemorrhage, mass effect, or infarction. 2.  Mild nonspecific white matter changes. 3.  Mild brain parenchymal volume loss. Bilateral mastoid effusions. Cervical Spine MRI: 1.  At the C5/C6 and C6/C7 levels, there are broad bar disc osteophyte complexes which contribute to mild spinal canal narrowing. 2.  No significant posterior disc bulge or spinal canal narrowing at any other level within the cervical spine. 3.  Multilevel uncovertebral joint disease and facet arthropathy with severe multilevel neural foraminal narrowing at C3/C4 - C6/C7 as described above. Thoracic Spine MRI: 1.  No evidence of signal abnormality or expansion within the thoracic spinal cord. 2.  No significant posterior disc bulge, spinal canal, or neural foraminal narrowing at any level within the thoracic spine.         Per the 2021 electromyogram report:  EMG REPORT.    CONCLUSION: Abnormal nerve conduction study (NCS) and EMG of Rt upper and bilateral lower extremities as described in the data sheet. The findings suggest the follows.    1. Severe bilateral tibial and right peroneal motor neuropathy due to no obtainable responses were noted in these motor nerve studies.    2. Severe right median motor and sensory neuropathy where sensory response is not obtainable and there is likely superimposed carpal tunnel syndrome because of the significant slowing noted at the wrist level and distal to the wrist level which also prolonged when compared to the ulnar nerve distal latency.    3. Severe right ulnar motor and sensory neuropathy where sensory response is not obtainable and there is superimposed decrease nerve conduction velocity at the range of 20-21 m/sec at the inferior aspect of medial epicondyle and across the medial epicondyle regions. The findings suggest that there is likely superimposed right compressive ulnar neuropathy at the elbow level as well.    4. Severe right  radial, median, ulnar sensory neuropathy and bilateral medial plantar sensory neuropathy because no obtainable responses were noted in the sensory nerve studies.    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 Of note, exact localization base on paraspinal muscle study is not possible due to the overlapping of enervations in these regions. Also, underling chronic neuropathy make localization difficult if not impossible.     Tahira Osman FNP-C  [facility]  O. [phone]      Again, thank you for allowing me to participate in the care of your patient.        Sincerely,        FAY Leo CNP

## 2023-11-17 NOTE — PATIENT INSTRUCTIONS
"Radicular Pain    Radicular pain in either the arm or leg is usually from a bulging disc in the spine. A portion of the herniated disc may press against the nerves as the nerves exit the spine. This causes pain which is felt down the arm or leg. Other causes of radicular pain may include:  Fractures.  Heart disease.  Cancer.  An abnormal and usually degenerative state of the nervous system or nerves (neuropathy).    In most cases, radicular pain is treated without imaging unless symptoms do not start to improve. If that is the case, your provider may order a CT or MRI scan to determine the cause.     Nerves in the cervical spine (neck) may cause radicular pain into the outer shoulder and down the arm. It can spread down to the thumb and fingers. The symptoms vary depending on which nerve root has been affected. In most cases, radicular pain improves with conservative treatment such as physical therapy, cervical traction, medications, and epidural steroid injections. A program for neck rehabilitation with stretching and strengthening exercises is an important part of management. Treatment may take months, and surgery may be considered as a last resort if the symptoms do not improve.    Nerves in the lumbar spine (lower back) may cause radicular pain into the hip and down the leg. The commonly used term for this type of pain is \"sciatica\". Conservative treatment is also recommended for this problem. Most patients feel better after 2 to 4 weeks of rest and other supportive care. You should avoid bending, lifting, and all other activities which can make your pain worse. Physical therapy, traction, medications, and epidural steroid injections can be good options to help with your recovery. A program for back injury rehabilitation with stretching and strengthening exercises is an important part of management. Surgery may be considered as a last resort if symptoms do not improve with conservative treatment.     You may " take over-the-counter or prescription medicines for pain, discomfort, or fever as directed by your caregiver. Muscle relaxants may help by relieving more severe pain and spasm. Neuropathic medication (such as gabapentin, lyrica, or cymbalta) can help decrease your symptoms of nerve pain as well. Cold or massage can also give significant relief. Spinal manipulation is not recommended as it can increase the degree of disc protrusion. We do not recommend taking narcotic medication such as percocet, oxycodone, norco, dilaudid, or others unless pain is severe and not controlled with any other oral options.    Epidural steroid injections are often effective treatment for radicular pain. These injections deliver strong anti-inflammatory medicine to the area directly around the nerve root in the space between your back bones (vertebrae). Your provider can give you more information about steroid injections. These injections are most effective when given within two weeks of the onset of acute pain. You should see your provider for follow up care as recommended.     In most cases, radicular pain is treated without imaging unless symptoms do not start to improve. If that is the case, your provider may order a CT or MRI scan to determine the cause.     SEEK IMMEDIATE MEDICAL CARE IF:  You develop increased pain, weakness, or numbness in your arm or leg.  You develop difficulty with bladder or bowel control.  You develop abdominal pain.    Document Released: 01/25/2006 Document Revised: 03/11/2013 Document Reviewed: 04/12/2010  Patient Information  2013 [employer].           Imaging (Xray, CT, or MRI) has been ordered today.   Radiology will call you to schedule. Please call below if you do not hear from them in the next couple of days.     [facility] Radiology Scheduling:  Please call [phone] to schedule your image(s) (select option #1).    There are 3 different locations:    [place] [address]  [place] [id number]    [facility]  [address], [address]   [place] [id number]    [facility]  [address] [address]         ~Please call our [facility] Nurse Navigation line [phone] with any questions or concerns about your treatment plan, if symptoms worsen and you would like to be seen urgently, or if you have any new or worsening numbness, weakness, or problems controlling bladder and bowel function.  ~You are also welcome to contact Tahira Osman via [facility], but please be aware that responses to [facility] message may take 2-3 days due to the high volume of patients seen in clinic.

## 2023-11-17 NOTE — PROGRESS NOTES
ASSESSMENT: Santo Joyce is a 73 year old male presents for consultation at the request of PCP Bakari Carlson, who presents today for new patient evaluation of :     -Cervical radiculopathy    Patient has weakness in his hands, and sensory loss in his hands on exam.  He has chronic distal bilateral lower extremity weakness, which was present prior to his lumbar surgeries.  He is hoping to attempt to preserve his hand function.  We reviewed his cervical MRI study, which does show multilevel severe bilateral neural foraminal narrowing in the cervical spine.  We reviewed his thoracic MRI, which appears normal with wear and tear degenerative disc changes he reportedly had an EMG of both upper extremities in June of this year, however I do not have access to this report.  We we will contact [facility] and have these results pushed to our system for review.  His previous EMGs show carpal tunnel and ulnar neuropathy, as well as polyneuropathy.  I would anticipate a neurosurgery referral given his hand weakness and numbness, and recommended adding cervical flexion-extension x-rays today.  We will touch base after we have EMG results           No data to display                     Diagnoses and all orders for this visit:  Spondylolisthesis of cervical region  -     XR Cervical Spine Flex/Ext 2/3 Views; Future  DDD (degenerative disc disease), cervical  -     Spine  Referral       PLAN:  Reviewed spine anatomy and disease process. Discussed diagnosis and treatment options with the patient today. A shared decision making model was used. The patient's values and choices were respected. The following represents what was discussed and decided upon by the provider and the patient.     -DIAGNOSTIC TESTS:  Images were personally reviewed and interpreted and explained to patient today using spine model.   -- I ordered cervical flexion-extension x-rays    -PHYSICAL THERAPY:    -Patient is already in physical therapy, and will  continue this  Discussed the importance of core strengthening, ROM, stretching exercises with the patient and how each of these entities is important in decreasing pain.  Explained to the patient that the purpose of physical therapy is to teach the patient a home exercise program.  These exercises need to be performed every day in order to decrease pain and prevent future occurrences of pain.    -MEDICATIONS:    -No medication changes were made today.  Agree with gabapentin for his hand numbness.  As he is not having much pain, would not add any additional medications to this plan  Discussed multiple medication options today with patient. Discussed risks, side effects, and proper use of medications. Patient verbalized understanding.    -INTERVENTIONS:    We did not discuss injections today    -PATIENT EDUCATION: Total time of 40 minutes, on the day of service, spent with the patient, reviewing the chart, placing orders, and documenting.   -Today we also discussed the issues related to the pros and cons of the current treatment plan.    -FOLLOW-UP:   Follow-up telephone call to review EMG and x-ray results    Advised patient to call the [facility] if symptoms worsen or if they develop red flag symptoms such as numbness, weakness, severe pain uncontrolled by current pain med regimen, or any new or worsening problems controlling bladder and bowel function.   ______________________________________________________________________    SUBJECTIVE:   Santo Joyce  is a 73 year old male history of depression, neuropathy, CMT and 2 previous lumbar spine surgeries who presents today for new patient evaluation of cervical radiculopathy    Patient was referred by neurology for further evaluation of bilateral hand numbness, tingling in the setting of recent cervical MRI results.  Santo reports chronic numbness and tingling in both of his hands, particularly in the thumb and index finger, and fourth and fifth fingers.  He  reports progressing weakness in his hands and also legs.  He denies any neck or lower back pain, radicular arm or leg pain, or changes in bowel or bladder control. He has been walking with a rolling walker for the last year or so.  He did have bilateral foot drop prior to his lumbar surgery. he has had lumbar surgery x2 and was cleared of any further surgical needs based on repeat lumbar imaging recently.  He is followed by neurology and was diagnosed with CMT. neurology team has discussed possible LP for further work-up, but this has not been pursued.  He was recommended to have MRI imaging of his brain, cervical spine, thoracic spine and has not been able to discuss results yet.  Neurology was reportedly waiting for EMG results to be pushed to the system as well.  He had a bilateral upper extremity EMG in June 2023 done at [facility]    He has not had any cervical spine surgery, injections, or chiropractic care  He has had 2 lumbar spine surgeries      -Treatment to Date:     -Medications:  gabapentin    Current Outpatient Medications   Medication    atorvastatin (LIPITOR) 10 MG tablet    cetirizine (ZYRTEC) 10 MG tablet    CINNAMON PO    escitalopram (LEXAPRO) 20 MG tablet    furosemide (LASIX) 20 MG tablet    gabapentin (NEURONTIN) 300 MG capsule    Glucosamine-Chondroitin 500-250 MG CAPS    L-Lysine 1000 MG TABS    montelukast (SINGULAIR) 10 MG tablet    multivitamin w/minerals (MULTI-VITAMIN) tablet    omeprazole-sodium bicarbonate  MG CAPS per capsule    Probiotic Product (SUPER PROBIOTIC) CAPS    propranolol (INDERAL) 20 MG tablet    traZODone (DESYREL) 50 MG tablet    Turmeric (QC TUMERIC COMPLEX PO)    Vitamin D3 (VITAMIN D) 10 MCG (400 UNIT) tablet    ZINC METHIONATE PO     No current facility-administered medications for this visit.       No Known Allergies    No past medical history on file.     There is no problem list on file for this patient.      No past surgical history on file.    No family  "history on file.    Reviewed past medical, surgical, and family history with patient found on new patient intake packet located in EMR Media tab.     SOCIAL HX: Non-smoker, no alcohol use, no heavy drinking, no recreational drug use    Oswestry (JAKY) Questionnaire:         No data to display                Neck Disability Index:       No data to display                   PHQ-2 Score:       10/6/2023    10:52 AM   PHQ-2 ( 1999 [employer])   Q1: Little interest or pleasure in doing things 0   Q2: Feeling down, depressed or hopeless 0   PHQ-2 Score 0          ROS: Positive for sexual dysfunction, ringing in ears, feet and leg swelling, color changes in hands and feet, shortness of breath, wheezing, diarrhea, nausea vomiting, joint pain, muscle pain, muscle fatigue, imbalance, falls, easy bruising, depression specifically negative for bowel/bladder dysfunction, balance changes, headache, dizziness, foot drop, fevers, chills, appetite changes, nausea/vomiting, unexplained weight loss. Otherwise 13 systems reviewed are negative. Please see the patient's intake questionnaire from today for details.    OBJECTIVE:  BP (!) 153/70   Pulse 76   Ht 6' 3\" (1.905 m)   Wt 262 lb (118.8 kg)   BMI 32.75 kg/m      PHYSICAL EXAMINATION:  --CONSTITUTIONAL: Vital signs as above. No acute distress. The patient is well nourished and well groomed.  --PSYCHIATRIC: The patient is awake, alert, oriented to person, place, and time, and answering questions appropriately with clear speech. Appropriate mood and affect   --HEENT: Sclera are non-injected. Extraocular muscles are intact. Moist oral mucosa.  --SKIN: Skin over the face, bilateral upper extremities, and posterior torso is clean, dry, intact without rashes.  --RESPIRATORY: Normal rhythm and effort. No abnormal accessory muscle breathing patterns noted.     --GROSS MOTOR: Arises with difficulty from seated position, uses rolling walker to shuffle to table    --UPPER EXTREMITY MOTOR " TESTING:  Shoulder abduction: right 5/5, left 5/5  Triceps: right 5/5 left 5/5  Biceps:right 5/5  left 5/5,   Hand :  right 5/5  left 5/5,   Intrinsics: right 4-/5  left 4-/5,   Extensors: right 4-/5  left 4-/5,     --LOWER EXTREMITY MOTOR TESTING  Hip flexion: right 5/5  left 5/5,   Quads:right 5/5  left 5/5,   Hamstrings: right 5/5  left 5/5,   Dorsiflexion, plantarflexion, EHL N/A due to braces present    REFLEXES: 0/4 symmetric triceps, biceps, brachioradialis bilaterally. 0/4 symmetric patellar, achilles reflex bilaterally.  Negative  Hernandez's bilaterally.      SENSATION: of the upper and lower extremities is decreased to light touch brandie 1-2, 4-5 fingers. Sensation of lower extremities is grossly intact    He is wearing bilateral AFO braces    --VASCULAR: Warm upper and lower limbs bilaterally.     --CERVICAL SPINE: Inspection reveals no evidence of deformity or swelling. Range of motion is not limited in cervical flexion, extension, lateral rotation, head tilt. No tenderness to palpation of traps, scaps, or paraspinal musculature.    Left dorsal hand is swollen and somewhat tender consistent with reported fracture within the last week    RESULTS:   Prior medical records from [facility]where were reviewed today.         IMAGING:  Spine imaging was personally reviewed and interpreted today. The images were shown to the patient and the findings were explained using a spine model.       MR Thoracic Spine w/o & w Contrast    Result Date: 10/28/2023  EXAM: MR THORACIC SPINE W/O and W CONTRAST, MR CERVICAL SPINE W/O and W CONTRAST, MR BRAIN W/O and W CONTRAST LOCATION: [facility] DATE: 10/27/2023 INDICATION:  Rapidly progressive weakness COMPARISON: None. CONTRAST: Gadavist 12 mL TECHNIQUE: 1.  Routine MRI brain without and with IV contrast. 2.  Routine cervical spine MRI without and with IV contrast. 3.  Routine Thoracic Spine MRI without and with IV contrast.  FINDINGS: BRAIN MRI: No abnormal restricted diffusion to suggest acute infarct. Few scattered foci of signal abnormality within the cerebral hemispheric white matter which are nonspecific, though most commonly ascribed to chronic small vessel ischemic disease. The ventricles  and sulci are prominent consistent with mild brain parenchymal volume loss. No evidence of acute intracranial hemorrhage, mass effect, or extra-axial collection. No evidence of abnormal brain parenchymal or leptomeningeal enhancement. No cerebellar tonsillar ectopia. Expected signal voids within the distal vertebral, basilar, and bilateral internal carotid arteries. The globes are unremarkable. The partially imaged paranasal sinuses are unremarkable. Bilateral mastoid effusions. The visualized skull base and calvarium are unremarkable. CERVICAL SPINE MRI: Anterolisthesis of C3 on C4 measuring 3 mm and C4 on C5 measuring 4 mm. C7 on T1 measuring 2 mm and T1-T2 measuring 2 mm. Anterior marginal osteophytes at C5/C6 and C6/C7. The vertebral bodies of the cervical spine otherwise have normal stature, alignment, and marrow signal intensity. No evidence of signal abnormality, expansion, or enhancement within the cervical spinal cord. C2/C3:  Mild loss of disc signal and disc height. No posterior disc bulge or spinal canal narrowing. Uncovertebral joint disease and facet arthropathy with moderate right neural foraminal narrowing. No left neural foraminal narrowing. C3/C4:  Mild loss of disc signal and disc height. No posterior disc bulge or spinal canal narrowing. Uncovertebral joint disease and facet arthropathy with severe bilateral neural foraminal narrowing. C4/C5:  Anterolisthesis of C4 on C5 measuring 4 mm. No posterior disc bulge or spinal canal narrowing. Uncovertebral joint disease and facet arthropathy with severe bilateral neural foraminal narrowing. C5/C6:  Broad bar disc osteophyte complex with mild spinal canal narrowing. Uncovertebral  joint disease and facet arthropathy with severe bilateral neural foraminal narrowing.  C6/C7:  Broad bar disc osteophyte complex with mild spinal canal narrowing. Uncovertebral joint disease and facet arthropathy with moderate to severe bilateral neural foraminal narrowing C7/T1: No posterior disc bulge or spinal canal narrowing. No neural foraminal narrowing. THORACIC SPINE MRI: Moderate thoracic kyphosis. Focal fat or hemangioma at T8. Anterior marginal osteophytes at T1/T2 - T11/T12. Small endplate Schmorl's nodes at T3/T4 - T10/T11. The vertebral bodies of the thoracic spine otherwise have normal stature, alignment, and marrow signal intensity. No evidence of signal abnormality or expansion within the thoracic spinal cord. No abnormal contrast enhancement within the terminal spinal cord or cauda equina nerve roots. No significant posterior disc bulge, spinal canal, or neural foraminal narrowing at any level within the thoracic spine.     IMPRESSION: Brain MRI: 1.  No evidence of acute intracranial hemorrhage, mass effect, or infarction. 2.  Mild nonspecific white matter changes. 3.  Mild brain parenchymal volume loss. Bilateral mastoid effusions. Cervical Spine MRI: 1.  At the C5/C6 and C6/C7 levels, there are broad bar disc osteophyte complexes which contribute to mild spinal canal narrowing. 2.  No significant posterior disc bulge or spinal canal narrowing at any other level within the cervical spine. 3.  Multilevel uncovertebral joint disease and facet arthropathy with severe multilevel neural foraminal narrowing at C3/C4 - C6/C7 as described above. Thoracic Spine MRI: 1.  No evidence of signal abnormality or expansion within the thoracic spinal cord. 2.  No significant posterior disc bulge, spinal canal, or neural foraminal narrowing at any level within the thoracic spine.      MR Cervical Spine w/o & w Contrast    Result Date: 10/28/2023  EXAM: MR THORACIC SPINE W/O and W CONTRAST, MR CERVICAL SPINE W/O and  W CONTRAST, MR BRAIN W/O and W CONTRAST LOCATION: [facility] DATE: 10/27/2023 INDICATION:  Rapidly progressive weakness COMPARISON: None. CONTRAST: Gadavist 12 mL TECHNIQUE: 1.  Routine MRI brain without and with IV contrast. 2.  Routine cervical spine MRI without and with IV contrast. 3.  Routine Thoracic Spine MRI without and with IV contrast. FINDINGS: BRAIN MRI: No abnormal restricted diffusion to suggest acute infarct. Few scattered foci of signal abnormality within the cerebral hemispheric white matter which are nonspecific, though most commonly ascribed to chronic small vessel ischemic disease. The ventricles  and sulci are prominent consistent with mild brain parenchymal volume loss. No evidence of acute intracranial hemorrhage, mass effect, or extra-axial collection. No evidence of abnormal brain parenchymal or leptomeningeal enhancement. No cerebellar tonsillar ectopia. Expected signal voids within the distal vertebral, basilar, and bilateral internal carotid arteries. The globes are unremarkable. The partially imaged paranasal sinuses are unremarkable. Bilateral mastoid effusions. The visualized skull base and calvarium are unremarkable. CERVICAL SPINE MRI: Anterolisthesis of C3 on C4 measuring 3 mm and C4 on C5 measuring 4 mm. C7 on T1 measuring 2 mm and T1-T2 measuring 2 mm. Anterior marginal osteophytes at C5/C6 and C6/C7. The vertebral bodies of the cervical spine otherwise have normal stature, alignment, and marrow signal intensity. No evidence of signal abnormality, expansion, or enhancement within the cervical spinal cord. C2/C3:  Mild loss of disc signal and disc height. No posterior disc bulge or spinal canal narrowing. Uncovertebral joint disease and facet arthropathy with moderate right neural foraminal narrowing. No left neural foraminal narrowing. C3/C4:  Mild loss of disc signal and disc height. No posterior disc bulge or spinal canal narrowing. Uncovertebral joint  disease and facet arthropathy with severe bilateral neural foraminal narrowing. C4/C5:  Anterolisthesis of C4 on C5 measuring 4 mm. No posterior disc bulge or spinal canal narrowing. Uncovertebral joint disease and facet arthropathy with severe bilateral neural foraminal narrowing. C5/C6:  Broad bar disc osteophyte complex with mild spinal canal narrowing. Uncovertebral joint disease and facet arthropathy with severe bilateral neural foraminal narrowing.  C6/C7:  Broad bar disc osteophyte complex with mild spinal canal narrowing. Uncovertebral joint disease and facet arthropathy with moderate to severe bilateral neural foraminal narrowing C7/T1: No posterior disc bulge or spinal canal narrowing. No neural foraminal narrowing. THORACIC SPINE MRI: Moderate thoracic kyphosis. Focal fat or hemangioma at T8. Anterior marginal osteophytes at T1/T2 - T11/T12. Small endplate Schmorl's nodes at T3/T4 - T10/T11. The vertebral bodies of the thoracic spine otherwise have normal stature, alignment, and marrow signal intensity. No evidence of signal abnormality or expansion within the thoracic spinal cord. No abnormal contrast enhancement within the terminal spinal cord or cauda equina nerve roots. No significant posterior disc bulge, spinal canal, or neural foraminal narrowing at any level within the thoracic spine.     IMPRESSION: Brain MRI: 1.  No evidence of acute intracranial hemorrhage, mass effect, or infarction. 2.  Mild nonspecific white matter changes. 3.  Mild brain parenchymal volume loss. Bilateral mastoid effusions. Cervical Spine MRI: 1.  At the C5/C6 and C6/C7 levels, there are broad bar disc osteophyte complexes which contribute to mild spinal canal narrowing. 2.  No significant posterior disc bulge or spinal canal narrowing at any other level within the cervical spine. 3.  Multilevel uncovertebral joint disease and facet arthropathy with severe multilevel neural foraminal narrowing at C3/C4 - C6/C7 as described  above. Thoracic Spine MRI: 1.  No evidence of signal abnormality or expansion within the thoracic spinal cord. 2.  No significant posterior disc bulge, spinal canal, or neural foraminal narrowing at any level within the thoracic spine.    MR Brain w/o & w Contrast    Result Date: 10/28/2023  EXAM: MR THORACIC SPINE W/O and W CONTRAST, MR CERVICAL SPINE W/O and W CONTRAST, MR BRAIN W/O and W CONTRAST LOCATION: [facility] DATE: 10/27/2023 INDICATION:  Rapidly progressive weakness COMPARISON: None. CONTRAST: Gadavist 12 mL TECHNIQUE: 1.  Routine MRI brain without and with IV contrast. 2.  Routine cervical spine MRI without and with IV contrast. 3.  Routine Thoracic Spine MRI without and with IV contrast. FINDINGS: BRAIN MRI: No abnormal restricted diffusion to suggest acute infarct. Few scattered foci of signal abnormality within the cerebral hemispheric white matter which are nonspecific, though most commonly ascribed to chronic small vessel ischemic disease. The ventricles  and sulci are prominent consistent with mild brain parenchymal volume loss. No evidence of acute intracranial hemorrhage, mass effect, or extra-axial collection. No evidence of abnormal brain parenchymal or leptomeningeal enhancement. No cerebellar tonsillar ectopia. Expected signal voids within the distal vertebral, basilar, and bilateral internal carotid arteries. The globes are unremarkable. The partially imaged paranasal sinuses are unremarkable. Bilateral mastoid effusions. The visualized skull base and calvarium are unremarkable. CERVICAL SPINE MRI: Anterolisthesis of C3 on C4 measuring 3 mm and C4 on C5 measuring 4 mm. C7 on T1 measuring 2 mm and T1-T2 measuring 2 mm. Anterior marginal osteophytes at C5/C6 and C6/C7. The vertebral bodies of the cervical spine otherwise have normal stature, alignment, and marrow signal intensity. No evidence of signal abnormality, expansion, or enhancement within the cervical spinal  cord. C2/C3:  Mild loss of disc signal and disc height. No posterior disc bulge or spinal canal narrowing. Uncovertebral joint disease and facet arthropathy with moderate right neural foraminal narrowing. No left neural foraminal narrowing. C3/C4:  Mild loss of disc signal and disc height. No posterior disc bulge or spinal canal narrowing. Uncovertebral joint disease and facet arthropathy with severe bilateral neural foraminal narrowing. C4/C5:  Anterolisthesis of C4 on C5 measuring 4 mm. No posterior disc bulge or spinal canal narrowing. Uncovertebral joint disease and facet arthropathy with severe bilateral neural foraminal narrowing. C5/C6:  Broad bar disc osteophyte complex with mild spinal canal narrowing. Uncovertebral joint disease and facet arthropathy with severe bilateral neural foraminal narrowing.  C6/C7:  Broad bar disc osteophyte complex with mild spinal canal narrowing. Uncovertebral joint disease and facet arthropathy with moderate to severe bilateral neural foraminal narrowing C7/T1: No posterior disc bulge or spinal canal narrowing. No neural foraminal narrowing. THORACIC SPINE MRI: Moderate thoracic kyphosis. Focal fat or hemangioma at T8. Anterior marginal osteophytes at T1/T2 - T11/T12. Small endplate Schmorl's nodes at T3/T4 - T10/T11. The vertebral bodies of the thoracic spine otherwise have normal stature, alignment, and marrow signal intensity. No evidence of signal abnormality or expansion within the thoracic spinal cord. No abnormal contrast enhancement within the terminal spinal cord or cauda equina nerve roots. No significant posterior disc bulge, spinal canal, or neural foraminal narrowing at any level within the thoracic spine.     IMPRESSION: Brain MRI: 1.  No evidence of acute intracranial hemorrhage, mass effect, or infarction. 2.  Mild nonspecific white matter changes. 3.  Mild brain parenchymal volume loss. Bilateral mastoid effusions. Cervical Spine MRI: 1.  At the C5/C6 and  C6/C7 levels, there are broad bar disc osteophyte complexes which contribute to mild spinal canal narrowing. 2.  No significant posterior disc bulge or spinal canal narrowing at any other level within the cervical spine. 3.  Multilevel uncovertebral joint disease and facet arthropathy with severe multilevel neural foraminal narrowing at C3/C4 - C6/C7 as described above. Thoracic Spine MRI: 1.  No evidence of signal abnormality or expansion within the thoracic spinal cord. 2.  No significant posterior disc bulge, spinal canal, or neural foraminal narrowing at any level within the thoracic spine.         Per the 2021 electromyogram report:  EMG REPORT.    CONCLUSION: Abnormal nerve conduction study (NCS) and EMG of Rt upper and bilateral lower extremities as described in the data sheet. The findings suggest the follows.    1. Severe bilateral tibial and right peroneal motor neuropathy due to no obtainable responses were noted in these motor nerve studies.    2. Severe right median motor and sensory neuropathy where sensory response is not obtainable and there is likely superimposed carpal tunnel syndrome because of the significant slowing noted at the wrist level and distal to the wrist level which also prolonged when compared to the ulnar nerve distal latency.    3. Severe right ulnar motor and sensory neuropathy where sensory response is not obtainable and there is superimposed decrease nerve conduction velocity at the range of 20-21 m/sec at the inferior aspect of medial epicondyle and across the medial epicondyle regions. The findings suggest that there is likely superimposed right compressive ulnar neuropathy at the elbow level as well.    4. Severe right radial, median, ulnar sensory neuropathy and bilateral medial plantar sensory neuropathy because no obtainable responses were noted in the sensory nerve studies.    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 Of note, exact localization base on paraspinal muscle study is not possible due to the overlapping of enervations in these regions. Also, underling chronic neuropathy make localization difficult if not impossible.     Tahira Osman FNP-C  [facility]  O. [phone]

## 2023-11-22 ENCOUNTER — TELEPHONE (OUTPATIENT)
Dept: PHYSICAL MEDICINE AND REHAB | Facility: CLINIC | Age: 73
End: 2023-11-22
Payer: MEDICARE

## 2023-11-22 DIAGNOSIS — R20.2 NUMBNESS AND TINGLING IN BOTH HANDS: Primary | ICD-10-CM

## 2023-11-22 DIAGNOSIS — R20.0 NUMBNESS AND TINGLING IN BOTH HANDS: Primary | ICD-10-CM

## 2023-11-22 NOTE — TELEPHONE ENCOUNTER
Called and informed patient of results and recommendations per note from Tahira Osman CNP. Pt was agreeable with this plan and has no further questions at this time. Phone number for nurse navigation and spine scheduling provided.    Pt states that he is going to [place] from the last week in November to the first week in April. Will be home 12/21/23-1/1/24 and would like to try to schedule EMG during that time frame.     ----- Message from FAY Ortiz CNP sent at 11/22/2023  8:33 AM CST -----  Please let Santo know that his cervical Xrays did not show any instability when he flexes and extends his neck. We were able to get his EMG results from June also, which showed neuropathy and no evidence at that time of nerve dysfunction coming from the cervical spine. I think we should repeat an EMG brandie upper extremities to verify. I will order this. I will likely refer him to neurosurgery thereafter to discuss whether a surgery could be of benefit given the severe narrowing around his nerves at several levels in his cervical spine.

## 2023-12-22 ENCOUNTER — OFFICE VISIT (OUTPATIENT)
Dept: PHYSICAL MEDICINE AND REHAB | Facility: CLINIC | Age: 73
End: 2023-12-22
Attending: NURSE PRACTITIONER
Payer: COMMERCIAL

## 2023-12-22 DIAGNOSIS — R20.2 NUMBNESS AND TINGLING IN BOTH HANDS: ICD-10-CM

## 2023-12-22 DIAGNOSIS — R20.0 NUMBNESS AND TINGLING IN BOTH HANDS: ICD-10-CM

## 2023-12-22 PROCEDURE — 95886 MUSC TEST DONE W/N TEST COMP: CPT | Performed by: PHYSICAL MEDICINE & REHABILITATION

## 2023-12-22 PROCEDURE — 95912 NRV CNDJ TEST 11-12 STUDIES: CPT | Performed by: PHYSICAL MEDICINE & REHABILITATION

## 2023-12-22 NOTE — PROGRESS NOTES
[facility]  [address] [place] [address] [address]  [place] [address]  Office: [phone] Fax: [phone]    Electromyography and Nerve Conduction Study Report        Indication: Patient presents at the request of Tahira Osman for a bilateral upper extremity EMG.  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  He has progressive bilateral hand paresthesias all digits of both hands with intermittent pain sense of weakness.  Right equal to left in severity although had an episode of significant hand pain a few weeks ago.  He is left-handed.  On exam, he has decree sensation to light touch mostly digits 3 through 5 bilaterally.  He has 1+ reflexes upper extremities with negative Jeana's diffusely.  Equivocal weakness 5 -/5 bilateral interosseous 5/5 elbow flexors/extensors wrist extensors and finger flexors.        Pt Exam Discussion (Communication Barriers):  Electromyography and nerve conduction testing, including associated discomfort, risks, benefits, and alternatives was discussed with the patient prior to the procedure.  No learning/ communication barriers; patient verbalized understanding of procedure.  Informed consent was obtained.           Pt Assessment:  Testing was successfully completed; patient tolerated testing well.       Blood Thinners: None Skin Temperature: Warmed 31.1                     EMG/NCS  results:     Nerve Conduction Studies  Motor Sites      Segment Distal Latency Neg. Amp CV F-Latency F-Estimate Comment   Site  (ms) (mV) (m/s) (ms) (ms)    Left Median (APB) Motor   Wrist Wrist-APB *11.7 5.0       Elbow Elbow-Wrist 20.0 *2.2 *35      Right Median (APB) Motor   Wrist Wrist-APB *13.6 3.5       Elbow Elbow-Wrist 21.4 *2.0 *37      Left Ulnar (ADM) Motor   Wrist  *5.7 3.9       Bel Elbow Bel Elbow-Wrist 13.5 2.2 35      Ab Elbow Ab Elbow-Wrist 17.1 2.5 35       Right Ulnar (ADM) Motor   Wrist  *7.3 3.1       Bel Elbow Bel Elbow-Wrist 14.3 2.6 39      Ab Elbow Ab Elbow-Wrist 18.0 2.3 39        Sensory Sites      Onset Lat Peak Lat Amp CV Comment   Site (ms) (ms) ( V) (m/s)    Right Median Anti Sensory   Wrist-Dig II 4.4 *5.8 *6 30    Left Median-Ulnar Palmar Sensory        Median   Palm-Wrist *NR *NR *NR *NR         Ulnar   Palm-Wrist *NR *NR *NR *NR    Right Median-Ulnar Palmar Sensory        Median   Palm-Wrist *NR *NR *NR *NR         Ulnar   Palm-Wrist *NR *NR *NR *NR    Left Radial Sensory   Forearm-Wrist 9.6 - - 10    Right Radial Sensory   Forearm-Wrist *NR *NR *NR *NR    Right Ulnar Anti Sensory   Wrist-Dig V *NR *NR *NR *NR        NCS Waveforms:    Motor                Sensory                      Electromyography     Side Muscle Nerve Root Ins Act Fibs Psw Fasc Recrt Dur Amp Poly Comment   Right Deltoid Axillary C5-6 Nml Nml Nml Nml Nml Nml Nml 0    Right Triceps Radial C6-7-8 Nml Nml Nml Nml Nml Nml Nml 0    Right PronatorTeres Median C6-7 Nml Nml Nml Nml Nml Nml Nml 0    Right 1stDorInt Ulnar C8-T1 *Incr *1+ *1+ Nml *Reduced *>12ms *Incr 0    Right Abd Poll Brev Median C8-T1 *Incr *2+ *2+ Nml *Reduced *>12ms *Incr 0    Left Deltoid Axillary C5-6 Nml Nml Nml Nml Nml Nml Nml 0    Left Triceps Radial C6-7-8 Nml Nml Nml Nml Nml Nml Nml 0    Left PronatorTeres Median C6-7 Nml Nml Nml Nml Nml Nml Nml 0    Left 1stDorInt Ulnar C8-T1 *Incr Nml Nml Nml *Reduced *>12ms *Incr 0    Left Abd Poll Brev Median C8-T1 *Incr *1+ *1+ Nml *Reduced *>12ms *Incr 0            Comment NCS: Abnormal study  1.  Absent bilateral upper extremity SNAPs including right median and ulnar, bilateral radial.  2.  Absent bilateral  Median and ulnar transcarpal studies.  3.  Dramatically prolonged bilateral median and ulnar CMAP latencies with dramatically reduced amplitudes and dramatically slowed conduction velocities.  No amplitude drop or conduction velocity slowing of the ulnar CMAP across  the elbow.    Comment EMG: Abnormal study  1.  Increased insertional activity right slightly worse than left bilateral FDI and APB with chronic motor unit potential changes.  Remainder upper extremity needle EMG normal.    Interpretation: Abnormal study: There is electrodiagnostic evidence of:    1.  Electrodiagnostic findings are consistent with a sensorimotor peripheral polyneuropathy, demyelinating with secondary axonal loss affecting bilateral upper extremities.  This is relatively severe.  The lack of asymmetry or conduction block suggest an inherited disorder.  This is consistent with his known history of hereditary motor or sensory neuropathy (Charcot-Kiley-Tooth).     2.  Denervation changes and chronic motor unit potential changes/reinnervation changes of the muscles of the hands bilaterally right slightly greater than left.  These findings are likely related to the polyneuropathy findings, but I cannot completely exclude a bilateral C8/T1 radiculopathy.  I am also unable to completely exclude a concomitant median neuropathy at the wrist in the bilateral upper extremities.      3.  There is no convincing electrodiagnostic evidence of ulnar neuropathy at the elbow in the bilateral upper extremities.    The testing was completed in its entirety by the physician.       It was our pleasure caring for your patient today, if there any questions or concerns please do not hesitate to contact us.

## 2023-12-22 NOTE — PATIENT INSTRUCTIONS
Thank you for choosing the [facility] for your EMG testing.    The ordering provider will receive your final EMG results within the next few days.  Please follow up with your provider for the results and further treatment recommendations.

## 2023-12-22 NOTE — LETTER
12/22/2023         RE: Santo Joyce  [address] [facility] [address]        Dear Colleague,    Thank you for referring your patient, Santo Joyce, to the [facility]. Please see a copy of my visit note below.    [facility]  [address] [place] [address] [address]  [place] [address]  Office: [phone] Fax: [phone]    Electromyography and Nerve Conduction Study Report        Indication: Patient presents at the request of Tahira Osman for a bilateral upper extremity EMG.  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  He has progressive bilateral hand paresthesias all digits of both hands with intermittent pain sense of weakness.  Right equal to left in severity although had an episode of significant hand pain a few weeks ago.  He is left-handed.  On exam, he has decree sensation to light touch mostly digits 3 through 5 bilaterally.  He has 1+ reflexes upper extremities with negative Jeana's diffusely.  Equivocal weakness 5 -/5 bilateral interosseous 5/5 elbow flexors/extensors wrist extensors and finger flexors.        Pt Exam Discussion (Communication Barriers):  Electromyography and nerve conduction testing, including associated discomfort, risks, benefits, and alternatives was discussed with the patient prior to the procedure.  No learning/ communication barriers; patient verbalized understanding of procedure.  Informed consent was obtained.           Pt Assessment:  Testing was successfully completed; patient tolerated testing well.       Blood Thinners: None Skin Temperature: Warmed 31.1                     EMG/NCS  results:     Nerve Conduction Studies  Motor Sites      Segment Distal Latency Neg. Amp CV F-Latency F-Estimate Comment   Site  (ms) (mV) (m/s) (ms) (ms)    Left Median (APB) Motor   Wrist Wrist-APB *11.7 5.0       Elbow Elbow-Wrist  20.0 *2.2 *35      Right Median (APB) Motor   Wrist Wrist-APB *13.6 3.5       Elbow Elbow-Wrist 21.4 *2.0 *37      Left Ulnar (ADM) Motor   Wrist  *5.7 3.9       Bel Elbow Bel Elbow-Wrist 13.5 2.2 35      Ab Elbow Ab Elbow-Wrist 17.1 2.5 35      Right Ulnar (ADM) Motor   Wrist  *7.3 3.1       Bel Elbow Bel Elbow-Wrist 14.3 2.6 39      Ab Elbow Ab Elbow-Wrist 18.0 2.3 39        Sensory Sites      Onset Lat Peak Lat Amp CV Comment   Site (ms) (ms) ( V) (m/s)    Right Median Anti Sensory   Wrist-Dig II 4.4 *5.8 *6 30    Left Median-Ulnar Palmar Sensory        Median   Palm-Wrist *NR *NR *NR *NR         Ulnar   Palm-Wrist *NR *NR *NR *NR    Right Median-Ulnar Palmar Sensory        Median   Palm-Wrist *NR *NR *NR *NR         Ulnar   Palm-Wrist *NR *NR *NR *NR    Left Radial Sensory   Forearm-Wrist 9.6 - - 10    Right Radial Sensory   Forearm-Wrist *NR *NR *NR *NR    Right Ulnar Anti Sensory   Wrist-Dig V *NR *NR *NR *NR        NCS Waveforms:    Motor                Sensory                      Electromyography     Side Muscle Nerve Root Ins Act Fibs Psw Fasc Recrt Dur Amp Poly Comment   Right Deltoid Axillary C5-6 Nml Nml Nml Nml Nml Nml Nml 0    Right Triceps Radial C6-7-8 Nml Nml Nml Nml Nml Nml Nml 0    Right PronatorTeres Median C6-7 Nml Nml Nml Nml Nml Nml Nml 0    Right 1stDorInt Ulnar C8-T1 *Incr *1+ *1+ Nml *Reduced *>12ms *Incr 0    Right Abd Poll Brev Median C8-T1 *Incr *2+ *2+ Nml *Reduced *>12ms *Incr 0    Left Deltoid Axillary C5-6 Nml Nml Nml Nml Nml Nml Nml 0    Left Triceps Radial C6-7-8 Nml Nml Nml Nml Nml Nml Nml 0    Left PronatorTeres Median C6-7 Nml Nml Nml Nml Nml Nml Nml 0    Left 1stDorInt Ulnar C8-T1 *Incr Nml Nml Nml *Reduced *>12ms *Incr 0    Left Abd Poll Brev Median C8-T1 *Incr *1+ *1+ Nml *Reduced *>12ms *Incr 0            Comment NCS: Abnormal study  1.  Absent bilateral upper extremity SNAPs including right median and ulnar, bilateral radial.  2.  Absent bilateral  Median and ulnar  transcarpal studies.  3.  Dramatically prolonged bilateral median and ulnar CMAP latencies with dramatically reduced amplitudes and dramatically slowed conduction velocities.  No amplitude drop or conduction velocity slowing of the ulnar CMAP across the elbow.    Comment EMG: Abnormal study  1.  Increased insertional activity right slightly worse than left bilateral FDI and APB with chronic motor unit potential changes.  Remainder upper extremity needle EMG normal.    Interpretation: Abnormal study: There is electrodiagnostic evidence of:    1.  Electrodiagnostic findings are consistent with a sensorimotor peripheral polyneuropathy, demyelinating with secondary axonal loss affecting bilateral upper extremities.  This is relatively severe.  The lack of asymmetry or conduction block suggest an inherited disorder.  This is consistent with his known history of hereditary motor or sensory neuropathy (Charcot-Kiley-Tooth).     2.  Denervation changes and chronic motor unit potential changes/reinnervation changes of the muscles of the hands bilaterally right slightly greater than left.  These findings are likely related to the polyneuropathy findings, but I cannot completely exclude a bilateral C8/T1 radiculopathy.  I am also unable to completely exclude a concomitant median neuropathy at the wrist in the bilateral upper extremities.      3.  There is no convincing electrodiagnostic evidence of ulnar neuropathy at the elbow in the bilateral upper extremities.    The testing was completed in its entirety by the physician.       It was our pleasure caring for your patient today, if there any questions or concerns please do not hesitate to contact us.    Again, thank you for allowing me to participate in the care of your patient.        Sincerely,        Jerry Pemberton DO

## 2023-12-26 ENCOUNTER — TELEPHONE (OUTPATIENT)
Dept: PHYSICAL MEDICINE AND REHAB | Facility: CLINIC | Age: 73
End: 2023-12-26
Payer: MEDICARE

## 2023-12-26 NOTE — PROGRESS NOTES
Please call the patient and let him know that I reviewed EMG/nerve conduction study of his upper extremities.  It does show a neuropathy that is consistent with his history of Charcot-Kiley-Tooth.  There is no convincing evidence of ulnar or median neuropathy.  I recommend the patient follow-up with Tahira Osman to further evaluate and discuss treatment plan.

## 2023-12-26 NOTE — TELEPHONE ENCOUNTER
"Called and informed patient of results and recommendations per note from Dr. Reed. Pt was agreeable with this plan and has no further questions at this time. Phone number for nurse navigation and spine scheduling provided. Patient is out of town from 1-1-24 to the first week in April 2024, so he will follow up in clinic either this week if he is able, or once he returns.     Per Dr. Reed:  \"Please call the patient and let him know that I reviewed EMG/nerve conduction study of his upper extremities.  It does show a neuropathy that is consistent with his history of Charcot-Kiley-Tooth.  There is no convincing evidence of ulnar or median neuropathy.  I recommend the patient follow-up with Tahira Osman to further evaluate and discuss treatment plan.\"  "

## 2023-12-31 ENCOUNTER — HEALTH MAINTENANCE LETTER (OUTPATIENT)
Age: 73
End: 2023-12-31

## 2024-02-14 ENCOUNTER — TRANSFERRED RECORDS (OUTPATIENT)
Dept: HEALTH INFORMATION MANAGEMENT | Facility: CLINIC | Age: 74
End: 2024-02-14

## 2024-02-14 LAB
ALT SERPL-CCNC: 28 U/L (ref 7–52)
AST SERPL-CCNC: 22 U/L (ref 13–39)
CHOLESTEROL (EXTERNAL): 138 MG/DL
CREATININE (EXTERNAL): 0.9 MG/DL (ref 0.6–1.3)
GFR ESTIMATED (EXTERNAL): 90 ML/MIN/1.73M^2
GLUCOSE (EXTERNAL): 84 MG/DL (ref 70–100)
HBA1C MFR BLD: 5 % (ref 4.3–5.6)
HDLC SERPL-MCNC: 28 MG/DL (ref 23–92)
LDL CHOLESTEROL CALCULATED (EXTERNAL): 58 MG/DL (ref 0–99)
POTASSIUM (EXTERNAL): 4.3 MMOL/L (ref 3.5–5.5)
TRIGLYCERIDES (EXTERNAL): 259 MG/DL (ref 30–150)
TSH SERPL-ACNC: 2.84 UIU/ML (ref 0.27–4.2)

## 2024-02-19 ENCOUNTER — TRANSFERRED RECORDS (OUTPATIENT)
Dept: HEALTH INFORMATION MANAGEMENT | Facility: CLINIC | Age: 74
End: 2024-02-19

## 2024-02-20 ENCOUNTER — TRANSFERRED RECORDS (OUTPATIENT)
Dept: HEALTH INFORMATION MANAGEMENT | Facility: CLINIC | Age: 74
End: 2024-02-20

## 2024-04-02 ENCOUNTER — TRANSFERRED RECORDS (OUTPATIENT)
Dept: HEALTH INFORMATION MANAGEMENT | Facility: CLINIC | Age: 74
End: 2024-04-02

## 2024-04-08 ENCOUNTER — TRANSFERRED RECORDS (OUTPATIENT)
Dept: HEALTH INFORMATION MANAGEMENT | Facility: CLINIC | Age: 74
End: 2024-04-08

## 2024-04-15 ENCOUNTER — TRANSFERRED RECORDS (OUTPATIENT)
Dept: HEALTH INFORMATION MANAGEMENT | Facility: CLINIC | Age: 74
End: 2024-04-15

## 2024-04-17 ENCOUNTER — OFFICE VISIT (OUTPATIENT)
Dept: NEUROLOGY | Facility: CLINIC | Age: 74
End: 2024-04-17
Payer: MEDICARE

## 2024-04-17 VITALS
DIASTOLIC BLOOD PRESSURE: 66 MMHG | HEART RATE: 73 BPM | BODY MASS INDEX: 33.55 KG/M2 | SYSTOLIC BLOOD PRESSURE: 138 MMHG | WEIGHT: 268.4 LBS

## 2024-04-17 DIAGNOSIS — G62.9 PERIPHERAL POLYNEUROPATHY: Primary | ICD-10-CM

## 2024-04-17 PROCEDURE — 99213 OFFICE O/P EST LOW 20 MIN: CPT | Performed by: PSYCHIATRY & NEUROLOGY

## 2024-04-17 RX ORDER — BUMETANIDE 1 MG/1
TABLET ORAL
COMMUNITY
Start: 2023-11-01

## 2024-04-17 RX ORDER — OMEGA-3 FATTY ACIDS/FISH OIL 300-1000MG
800 CAPSULE ORAL
COMMUNITY
Start: 2022-12-16

## 2024-04-17 RX ORDER — CEPHALEXIN 500 MG/1
CAPSULE ORAL
COMMUNITY
Start: 2023-09-26 | End: 2024-04-29

## 2024-04-17 RX ORDER — SERTRALINE HYDROCHLORIDE 100 MG/1
TABLET, FILM COATED ORAL
COMMUNITY
Start: 2024-03-13

## 2024-04-17 RX ORDER — TRAMADOL HYDROCHLORIDE 50 MG/1
TABLET ORAL
COMMUNITY
Start: 2024-03-25 | End: 2024-04-29

## 2024-04-17 RX ORDER — FLUTICASONE PROPIONATE 50 MCG
SPRAY, SUSPENSION (ML) NASAL
COMMUNITY
Start: 2024-01-02

## 2024-04-17 RX ORDER — MELATONIN 10 MG
20 CAPSULE ORAL
COMMUNITY
Start: 2022-08-12

## 2024-04-17 NOTE — NURSING NOTE
"Santo Joyce is a 73 year old male who presents for:  Chief Complaint   Patient presents with    NEUROPATHY     SIRISHA feet swollen; sometimes both calves will swell.         Initial Vitals:  /66   Pulse 73   Wt 121.7 kg (268 lb 6.4 oz)   BMI 33.55 kg/m   Estimated body mass index is 33.55 kg/m  as calculated from the following:    Height as of 11/17/23: 1.905 m (6' 3\").    Weight as of this encounter: 121.7 kg (268 lb 6.4 oz).. Body surface area is 2.54 meters squared. BP completed using cuff size: deyanira King    "

## 2024-04-17 NOTE — PROGRESS NOTES
ESTABLISHED PATIENT NEUROLOGY NOTE    DATE OF VISIT: 4/17/2024  MRN: [id number]  PATIENT NAME: Santo Joyce  YOB: 1950    No chief complaint on file.    SUBJECTIVE:                                                      HISTORY OF PRESENT ILLNESS:  Santo is here for follow up regarding peripheral neuropathy. I met him for initial consultation about       CURRENT MEDICATIONS:   Current Outpatient Medications   Medication Sig Dispense Refill    atorvastatin (LIPITOR) 10 MG tablet Take 1 tablet by mouth daily      cetirizine (ZYRTEC) 10 MG tablet Take 10 mg by mouth daily      CINNAMON PO       escitalopram (LEXAPRO) 20 MG tablet 20 mg      furosemide (LASIX) 20 MG tablet Take 1 tablet by mouth daily      gabapentin (NEURONTIN) 300 MG capsule Take 2 capsules by mouth 3 times daily      Glucosamine-Chondroitin 500-250 MG CAPS Take 2 tablets by mouth daily      L-Lysine 1000 MG TABS Take 1,000 mg by mouth      montelukast (SINGULAIR) 10 MG tablet Take 1 tablet by mouth every evening      multivitamin w/minerals (MULTI-VITAMIN) tablet Take 1 tablet by mouth daily      omeprazole-sodium bicarbonate  MG CAPS per capsule Take 1 capsule by mouth daily before breakfast      Probiotic Product (SUPER PROBIOTIC) CAPS Take 2 capsules by mouth daily      propranolol (INDERAL) 20 MG tablet Take 1 tablet by mouth 3 times daily      traZODone (DESYREL) 50 MG tablet Take 1 tablet by mouth at bedtime      Turmeric (QC TUMERIC COMPLEX PO)       Vitamin D3 (VITAMIN D) 10 MCG (400 UNIT) tablet daily, 0 Refill(s), Type: Maintenance      ZINC METHIONATE PO Take 1 capsule by mouth daily       No current facility-administered medications for this visit.       RECENT DIAGNOSTIC STUDIES:   Labs: No results found for any visits on 04/17/24.    Imaging: ***    REVIEW OF SYSTEMS:                                                      10-point review of systems is negative except as mentioned above in HPI.    EXAM:                                                       Physical Exam:   Vitals: There were no vitals taken for this visit.  BMI= There is no height or weight on file to calculate BMI.  GENERAL: NAD.  HEENT: NC/AT.  CV: RRR. S1, S2.   NECK: No bruits.  PULM: Non-labored breathing.   Neurologic:  MENTAL STATUS: Alert, attentive. Speech is fluent. Normal comprehension. Mini-cog: ***/5. Normal concentration. Adequate fund of knowledge.   CRANIAL NERVES: Discs flat. Visual fields intact to confrontation. Pupils equally, round and reactive to light. Facial sensation and movement normal. EOM full. Hearing intact to conversation. Trapezius strength intact. Palate moves symmetrically. Tongue midline.  MOTOR: 5/5 in proximal and distal muscle groups of upper and lower extremities. Tone and bulk normal.   DTRs: Intact and symmetric in biceps, BR, triceps and patellae. Ankle jerks***. Babinski down-going bilaterally.   SENSATION: Normal light touch and pinprick. Intact proprioception at great toes. Vibration: {tandem walkin} at both ankles.   COORDINATION: Normal finger nose finger. Finger tapping normal. Knee heel shin normal.  STATION AND GAIT: Romberg {POSITIVE:[id number]}. Good postural reflexes. Casual gait and tandem {tandem walkin}  *** hand-dominant.     ASSESSMENT and PLAN:                                                      Assessment:  No diagnosis found.    Plan:  There are no Patient Instructions on file for this visit.    Total Time: *** minutes were spent with the patient and in chart review/documentation (as described above in Assessment and Plan)/coordinating the care on date of service.    Deanne Monroe MD  Neurology    Dragon software used in the dictation of this note.                   surgery.     Santo is here with his SO, Rand.  They explained that he did have a more recent work-up through [facility].  Another EMG was completed this past summer and from that they were told he has neuropathy but otherwise the details are unclear.  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 Over the course of the past couple of years his balance has gotten worse. He has declined rapidly over the past couple of years, per Rand.  They clarified that he had 2 lumbar surgeries in the interim (most recent was a revision of the initial infusion: Dec 2022).  This helped from the standpoint of the thigh pain he was having but has not led to improvement in terms of his strength.      Strength did seem to decline more after he had COVID in August 2021.     He has trouble getting up from a chair. His legs give out from under him.   In terms of the hands, he does have some numbness in digits 4-5 in both hands. He has trouble with dexterity in the fingers.  No bladder or bowel symptoms.  He started to walk with a cane a while ago and he graduated to the walker about a year ago.  He did have a fall a few weeks ago and injured his left knee, tearing the meniscus.  He had a recent cortisone shot in the knee for this.     Santo thinks it may be exercise makes him feel weaker but rest does not help in terms of his strength.  No problems with double vision or neck weakness.  No problems with swallow.     He has not had any brain or upper spine imaging.  He has not undergone a CSF work-up.     Rand mentions that his previous neurologist did tell him that he does not have Parkinson's.     Josh did start with a new physical therapist just today.  He is not sure if therapy has been helpful in terms of his strength and mobility.      CURRENT MEDICATIONS:   Current Outpatient Medications   Medication Sig Dispense Refill    atorvastatin (LIPITOR) 10 MG tablet Take 1 tablet  by mouth daily      bumetanide (BUMEX) 1 MG tablet Take 1 tablet every day by oral route.      cetirizine (ZYRTEC) 10 MG tablet Take 10 mg by mouth daily      CINNAMON PO       fluticasone (FLONASE) 50 MCG/ACT nasal spray use 1 spray(s) in each nostril once daily      gabapentin (NEURONTIN) 300 MG capsule Take 3 capsules by mouth 2 times daily      Glucosamine-Chondroitin 500-250 MG CAPS Take 2 tablets by mouth daily      ibuprofen (ADVIL/MOTRIN) 200 MG capsule Take 800 mg by mouth (Patient not taking: Reported on 6/24/2024)      L-Lysine 1000 MG TABS Take 1,000 mg by mouth      Melatonin 10 MG CAPS Take 20 mg by mouth      montelukast (SINGULAIR) 10 MG tablet Take 1 tablet by mouth every evening      multivitamin w/minerals (MULTI-VITAMIN) tablet Take 1 tablet by mouth daily      omeprazole-sodium bicarbonate  MG CAPS per capsule Take 1 capsule by mouth daily before breakfast (Patient not taking: Reported on 6/24/2024)      Probiotic Product (SUPER PROBIOTIC) CAPS Take 2 capsules by mouth daily      propranolol (INDERAL) 20 MG tablet Take 1 tablet by mouth 2 times daily      sertraline (ZOLOFT) 100 MG tablet Take 1 tablet every day by oral route as directed for 30 days.      traZODone (DESYREL) 50 MG tablet Take 1 tablet by mouth at bedtime      ZINC METHIONATE PO Take 1 capsule by mouth daily      acetaminophen (TYLENOL) 325 MG tablet Take 975 mg by mouth      albuterol (PROAIR HFA/PROVENTIL HFA/VENTOLIN HFA) 108 (90 Base) MCG/ACT inhaler Inhale 1 puff into the lungs every 4 hours as needed for shortness of breath (Patient not taking: Reported on 6/24/2024)      co-enzyme Q-10 100 MG CAPS capsule Take 300 mg by mouth daily (Patient not taking: Reported on 6/24/2024)      omeprazole (PRILOSEC) 20 MG DR capsule Take 20 mg by mouth daily      red yeast rice 600 MG CAPS Take 1,200 mg by mouth daily (Patient not taking: Reported on 6/24/2024)      spironolactone (ALDACTONE) 25 MG tablet Take 1 tablet (25 mg) by  mouth daily 30 tablet 6    Turmeric (QC TUMERIC COMPLEX PO)  (Patient not taking: Reported on 6/24/2024)      UNABLE TO FIND 850 mg MEDICATION NAME: microaelgae (Patient not taking: Reported on 6/24/2024)      UNABLE TO FIND 400 mg MEDICATION NAME: chromium vit d (Patient not taking: Reported on 6/24/2024)      UNABLE TO FIND Take 1 capsule by mouth daily MEDICATION NAME: focus factor (Patient not taking: Reported on 6/24/2024)      UNABLE TO FIND Take 300 mg by mouth daily MEDICATION NAME: Omega XL (Patient not taking: Reported on 6/24/2024)      Vitamin D3 (VITAMIN D) 10 MCG (400 UNIT) tablet  (Patient not taking: Reported on 6/24/2024)       No current facility-administered medications for this visit.       RECENT DIAGNOSTIC STUDIES:   Labs: No results found for any visits on 04/17/24.      REVIEW OF SYSTEMS:                                                      10-point review of systems is negative except as mentioned above in HPI.    EXAM:                                                      Physical Exam:   Vitals: /66   Pulse 73   Wt 121.7 kg (268 lb 6.4 oz)   BMI 33.55 kg/m    BMI= Body mass index is 33.55 kg/m .  GENERAL: NAD.  HEENT: NC/AT.  CV: RRR. S1, S2.   NECK: No bruits.  PULM: Non-labored breathing.   EXTR: Mild pitting edema distal to mid calves.  Neurologic:  MENTAL STATUS: Alert, attentive. Speech is fluent. Normal comprehension. Normal concentration. Adequate fund of knowledge.   CRANIAL NERVES: Discs technically difficult to visualize. Visual fields intact to confrontation. Pupils equally, round and reactive to light. Facial sensation and movement normal. EOM full. Hearing intact to conversation. Sternocleidomastoids and trapezius strength intact. Palate moves symmetrically. Tongue midline.  MOTOR: Strength appears to be full in the upper extremities.  Some slight weakness with hip flexion on the left. AB/ADduction strength is normal.  Slight weakness bilaterally with knee extension.   Knee flexion is good bilaterally.  Evidence of foot drop on the right and very slight weakness in dorsiflexion on the left.  His right leg is slightly thinner than the left (not new, per SO).  Some atrophy in the hands.  DTRs: Mute.    SENSATION: Normal light touch and pinprick in the hands except for some hypersensitivity in the right pinky.  Absent pinprick in the legs distal to the knees. Vibration: Absent at right ankle, severely diminished on the left (1 second).   COORDINATION: Slight tremor (L>R) otherwise normal finger nose finger. Finger tapping is slow, he cannot put his thumb and pinkys together.  Knee heel shin requires a great deal of effort bilaterally.  STATION AND GAIT: Cannot test Romberg or postural reflexes due to unsteadiness on his feet.  He cannot get up from the chair without using his arms.  Gait is wide-based, cautious and steppage on the right.  Left hand-dominant.    ASSESSMENT and PLAN:                                                      Assessment:    ICD-10-CM    1. Peripheral polyneuropathy  G62.9 Adult Neurology  Referral          Plan:  Referral to Neuromuscular clinic for additional evaluation.  Continue the current dose of Gabapentin for now.  I agree with you plan to also see Cardiology for the swelling.  Return to general Neurology as needed,depending on plan through neuromuscular clinic.    Total Time: 25 minutes were spent with the patient and in chart review/documentation (as described above in Assessment and Plan)/coordinating the care on date of service.    Deanne Monroe MD  Neurology    Dragon software used in the dictation of this note.

## 2024-04-17 NOTE — Clinical Note
4/17/2024      Santo Joyce  [address] [facility] [address]      Dear Colleague,    Thank you for referring your patient, Santo Joyce, to the [facility] [place]. Please see a copy of my visit note below.    ESTABLISHED PATIENT NEUROLOGY NOTE    DATE OF VISIT: 4/17/2024  MRN: [id number]  PATIENT NAME: Santo Joyce  YOB: 1950    No chief complaint on file.    SUBJECTIVE:                                                      HISTORY OF PRESENT ILLNESS:  Santo is here for follow up regarding peripheral neuropathy. I met him for initial consultation about       CURRENT MEDICATIONS:   Current Outpatient Medications   Medication Sig Dispense Refill    atorvastatin (LIPITOR) 10 MG tablet Take 1 tablet by mouth daily      cetirizine (ZYRTEC) 10 MG tablet Take 10 mg by mouth daily      CINNAMON PO       escitalopram (LEXAPRO) 20 MG tablet 20 mg      furosemide (LASIX) 20 MG tablet Take 1 tablet by mouth daily      gabapentin (NEURONTIN) 300 MG capsule Take 2 capsules by mouth 3 times daily      Glucosamine-Chondroitin 500-250 MG CAPS Take 2 tablets by mouth daily      L-Lysine 1000 MG TABS Take 1,000 mg by mouth      montelukast (SINGULAIR) 10 MG tablet Take 1 tablet by mouth every evening      multivitamin w/minerals (MULTI-VITAMIN) tablet Take 1 tablet by mouth daily      omeprazole-sodium bicarbonate  MG CAPS per capsule Take 1 capsule by mouth daily before breakfast      Probiotic Product (SUPER PROBIOTIC) CAPS Take 2 capsules by mouth daily      propranolol (INDERAL) 20 MG tablet Take 1 tablet by mouth 3 times daily      traZODone (DESYREL) 50 MG tablet Take 1 tablet by mouth at bedtime      Turmeric (QC TUMERIC COMPLEX PO)       Vitamin D3 (VITAMIN D) 10 MCG (400 UNIT) tablet daily, 0 Refill(s), Type: Maintenance      ZINC METHIONATE PO Take 1 capsule by mouth daily       No current facility-administered medications for this visit.       RECENT DIAGNOSTIC STUDIES:    Labs: No results found for any visits on 24.    Imaging: ***    REVIEW OF SYSTEMS:                                                      10-point review of systems is negative except as mentioned above in HPI.    EXAM:                                                      Physical Exam:   Vitals: There were no vitals taken for this visit.  BMI= There is no height or weight on file to calculate BMI.  GENERAL: NAD.  HEENT: NC/AT.  CV: RRR. S1, S2.   NECK: No bruits.  PULM: Non-labored breathing.   Neurologic:  MENTAL STATUS: Alert, attentive. Speech is fluent. Normal comprehension. Mini-cog: ***/5. Normal concentration. Adequate fund of knowledge.   CRANIAL NERVES: Discs flat. Visual fields intact to confrontation. Pupils equally, round and reactive to light. Facial sensation and movement normal. EOM full. Hearing intact to conversation. Trapezius strength intact. Palate moves symmetrically. Tongue midline.  MOTOR: 5/5 in proximal and distal muscle groups of upper and lower extremities. Tone and bulk normal.   DTRs: Intact and symmetric in biceps, BR, triceps and patellae. Ankle jerks***. Babinski down-going bilaterally.   SENSATION: Normal light touch and pinprick. Intact proprioception at great toes. Vibration: {tandem walkin} at both ankles.   COORDINATION: Normal finger nose finger. Finger tapping normal. Knee heel shin normal.  STATION AND GAIT: Romberg {POSITIVE:801424}. Good postural reflexes. Casual gait and tandem {tandem walkin}  *** hand-dominant.     ASSESSMENT and PLAN:                                                      Assessment:  No diagnosis found.    Plan:  There are no Patient Instructions on file for this visit.    Total Time: *** minutes were spent with the patient and in chart review/documentation (as described above in Assessment and Plan)/coordinating the care on date of service.    Deanne Monroe MD  Neurology    Dragon software used in the dictation of  this note.                      Again, thank you for allowing me to participate in the care of your patient.        Sincerely,        Deanne Monroe MD

## 2024-04-17 NOTE — PATIENT INSTRUCTIONS
Plan:  Referral to Neuromuscular clinic for additional evaluation.  Continue the current dose of Gabapentin for now.  I agree with you plan to also see Cardiology for the swelling.  Return to general Neurology as needed,depending on plan through neuromuscular clinic.

## 2024-04-29 ENCOUNTER — OFFICE VISIT (OUTPATIENT)
Dept: NEUROLOGY | Facility: CLINIC | Age: 74
End: 2024-04-29
Attending: PSYCHIATRY & NEUROLOGY
Payer: COMMERCIAL

## 2024-04-29 VITALS — HEART RATE: 74 BPM | DIASTOLIC BLOOD PRESSURE: 78 MMHG | SYSTOLIC BLOOD PRESSURE: 124 MMHG | OXYGEN SATURATION: 95 %

## 2024-04-29 DIAGNOSIS — R26.89 OTHER ABNORMALITIES OF GAIT AND MOBILITY: ICD-10-CM

## 2024-04-29 DIAGNOSIS — G60.8 HNPP (HEREDITARY NEUROPATHY WITH PREDISPOSITION TO PRESSURE PALSY): Primary | ICD-10-CM

## 2024-04-29 DIAGNOSIS — G62.9 PERIPHERAL POLYNEUROPATHY: ICD-10-CM

## 2024-04-29 PROBLEM — R63.5 WEIGHT GAIN: Status: ACTIVE | Noted: 2023-11-01

## 2024-04-29 PROBLEM — F33.9 RECURRENT MAJOR DEPRESSIVE DISORDER (H): Status: ACTIVE | Noted: 2024-02-13

## 2024-04-29 PROBLEM — K40.90 INGUINAL HERNIA: Status: ACTIVE | Noted: 2024-04-29

## 2024-04-29 PROBLEM — R06.09 DOE (DYSPNEA ON EXERTION): Status: ACTIVE | Noted: 2023-11-01

## 2024-04-29 PROBLEM — R29.6 FALLS: Status: ACTIVE | Noted: 2023-11-06

## 2024-04-29 PROBLEM — Z86.16 HISTORY OF COVID-19: Status: ACTIVE | Noted: 2021-09-27

## 2024-04-29 PROBLEM — M79.89 SWELLING OF LEFT LOWER EXTREMITY: Status: ACTIVE | Noted: 2023-11-06

## 2024-04-29 PROBLEM — F32.A DEPRESSIVE DISORDER: Status: ACTIVE | Noted: 2020-05-19

## 2024-04-29 PROBLEM — M25.511 PAIN IN JOINT OF RIGHT SHOULDER: Status: ACTIVE | Noted: 2024-02-13

## 2024-04-29 PROBLEM — M54.16 CHRONIC LUMBAR RADICULOPATHY: Status: ACTIVE | Noted: 2020-05-19

## 2024-04-29 PROBLEM — E66.9 OBESITY WITH BODY MASS INDEX 30 OR GREATER: Status: ACTIVE | Noted: 2023-02-22

## 2024-04-29 PROBLEM — I20.89 STABLE ANGINA (H): Status: ACTIVE | Noted: 2018-09-05

## 2024-04-29 PROBLEM — E66.9 OBESITY: Status: ACTIVE | Noted: 2022-12-13

## 2024-04-29 PROBLEM — D64.9 ANEMIA: Status: ACTIVE | Noted: 2024-04-29

## 2024-04-29 PROBLEM — Z98.1 S/P LUMBAR SPINAL FUSION: Chronic | Status: ACTIVE | Noted: 2022-12-13

## 2024-04-29 PROBLEM — G25.0 ESSENTIAL TREMOR: Status: ACTIVE | Noted: 2024-04-29

## 2024-04-29 PROBLEM — F32.89 OTHER SPECIFIED DEPRESSIVE EPISODES: Status: ACTIVE | Noted: 2024-04-29

## 2024-04-29 PROBLEM — R25.1 TREMOR: Status: ACTIVE | Noted: 2020-05-19

## 2024-04-29 PROBLEM — Z74.09 OTHER REDUCED MOBILITY: Status: ACTIVE | Noted: 2024-04-29

## 2024-04-29 PROBLEM — M48.062 LUMBAR STENOSIS WITH NEUROGENIC CLAUDICATION: Status: ACTIVE | Noted: 2021-10-25

## 2024-04-29 PROBLEM — M19.90 OSTEOARTHROSIS: Status: ACTIVE | Noted: 2024-02-27

## 2024-04-29 PROBLEM — I87.2 VENOUS INSUFFICIENCY OF BOTH LOWER EXTREMITIES: Status: ACTIVE | Noted: 2022-06-13

## 2024-04-29 PROBLEM — J30.89 ENVIRONMENTAL AND SEASONAL ALLERGIES: Status: ACTIVE | Noted: 2022-06-13

## 2024-04-29 PROBLEM — N52.9 ED (ERECTILE DYSFUNCTION): Status: ACTIVE | Noted: 2024-04-29

## 2024-04-29 PROBLEM — T83.9XXA: Status: ACTIVE | Noted: 2024-04-29

## 2024-04-29 PROBLEM — G60.9 IDIOPATHIC PERIPHERAL NEUROPATHY: Status: ACTIVE | Noted: 2024-04-29

## 2024-04-29 PROBLEM — R79.89 ELEVATED D-DIMER: Status: ACTIVE | Noted: 2023-11-06

## 2024-04-29 PROBLEM — G47.00 INSOMNIA: Status: ACTIVE | Noted: 2020-05-19

## 2024-04-29 PROBLEM — R53.83 FATIGUE: Status: ACTIVE | Noted: 2024-02-13

## 2024-04-29 PROBLEM — G57.30 NEUROPATHY, PERONEAL NERVE: Status: ACTIVE | Noted: 2024-04-29

## 2024-04-29 PROBLEM — R26.2 DIFFICULTY IN WALKING, NOT ELSEWHERE CLASSIFIED: Status: ACTIVE | Noted: 2024-04-29

## 2024-04-29 PROBLEM — L60.1 ONYCHOLYSIS OF TOENAIL: Status: ACTIVE | Noted: 2024-04-16

## 2024-04-29 PROBLEM — M54.9 BACKACHE: Status: ACTIVE | Noted: 2024-04-29

## 2024-04-29 PROBLEM — R60.0 BILATERAL LEG EDEMA: Status: ACTIVE | Noted: 2022-06-13

## 2024-04-29 PROBLEM — G60.0 CHARCOT-MARIE-TOOTH DISEASE: Status: ACTIVE | Noted: 2024-04-29

## 2024-04-29 PROBLEM — G47.33 SEVERE OBSTRUCTIVE SLEEP APNEA: Status: ACTIVE | Noted: 2023-02-22

## 2024-04-29 PROBLEM — M54.50 PAIN IN LEFT LUMBAR REGION OF BACK: Status: ACTIVE | Noted: 2024-02-13

## 2024-04-29 PROBLEM — E78.5 HYPERLIPIDEMIA: Status: ACTIVE | Noted: 2023-02-22

## 2024-04-29 PROBLEM — R53.1 WEAKNESS: Status: ACTIVE | Noted: 2024-04-29

## 2024-04-29 PROBLEM — K21.9 GASTROESOPHAGEAL REFLUX DISEASE: Status: ACTIVE | Noted: 2020-05-19

## 2024-04-29 PROBLEM — L98.492: Status: ACTIVE | Noted: 2019-04-10

## 2024-04-29 PROBLEM — L89.899: Status: ACTIVE | Noted: 2019-04-10

## 2024-04-29 PROCEDURE — 99215 OFFICE O/P EST HI 40 MIN: CPT | Performed by: STUDENT IN AN ORGANIZED HEALTH CARE EDUCATION/TRAINING PROGRAM

## 2024-04-29 PROCEDURE — 99417 PROLNG OP E/M EACH 15 MIN: CPT | Performed by: STUDENT IN AN ORGANIZED HEALTH CARE EDUCATION/TRAINING PROGRAM

## 2024-04-29 PROCEDURE — G2211 COMPLEX E/M VISIT ADD ON: HCPCS | Performed by: STUDENT IN AN ORGANIZED HEALTH CARE EDUCATION/TRAINING PROGRAM

## 2024-04-29 RX ORDER — ALBUTEROL SULFATE 90 UG/1
1 AEROSOL, METERED RESPIRATORY (INHALATION) EVERY 4 HOURS PRN
COMMUNITY
Start: 2023-11-01

## 2024-04-29 RX ORDER — AMPICILLIN TRIHYDRATE 250 MG
1200 CAPSULE ORAL DAILY
COMMUNITY

## 2024-04-29 RX ORDER — UBIDECARENONE 100 MG
300 CAPSULE ORAL DAILY
COMMUNITY

## 2024-04-29 NOTE — LETTER
"    4/29/2024         RE: Santo Joyce  [address] [facility] [address]        Dear Colleague,    Thank you for referring your patient, Santo Joyce, to the [facility] [place]. Please see a copy of my visit note below.    CHIEF COMPLAINT / REASON FOR VISIT  Peripheral neuropathy    Referred by Dr. Raji Monroe    HISTORY OF PRESENT ILLNESS   is a 73 year old male presenting [facility] evaluation of peripheral neuropathy. Patient is accompanied by his significant other, who helped provide collateral history today.     He was also evaluated in [facility].  Several labs were performed.  Patient was told that he has neuropathy of uncertain etiology.    He first noticed bilateral ankle weakness about 30 years ago or even longer.  At that time, he noted that he could not do any activity that requires pedaling.  He was still able to drive.  The weakness started to affect his gait about 3 to 4 years ago.  He started tripping on his toes often and had multiple falls.  He started using a cane for years ago and switched to a walker about a year ago.  He did not notice much numbness in the feet until his gait started to be affected 4 years ago.  Around the same time, he had to notice numbness and weakness in the hands, especially in the fourth and fifth fingers, affecting hand dexterity.  He denies any associating burning or sharp pain other than dull aching shoulder pain.    His oldest daughter was also diagnosed with \"  22 disease\" when she was 18.  She has symptoms in the hands but not in the legs.  She is 48 years old and has no gait issues.  He has a total of 3 children, 2 daughters, 1 son, the son and other daughter asymptomatic.  Grandchildren are asymptomatic.    I have reviewed prior investigations as listed below:  -EMG from [facility] 11/19/2023 (Tabulated data from EMG report were personally reviewed and independently interpreted) : Right median and " "radial sensory sponsors were absent.  Bilateral tibial and fibular compound muscle action potentials were absent.  Right median and ulnar CMAP amplitudes were reduced with severely prolonged distal latencies and marked slowing of conduction velocity.  Needle EMG showed dense fibrillation potentials with myotonic like discharges in distal > proximal muscles of bilateral lower and upper limbs.  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  No evidence of acute intracranial hemorrhage, mass effect, or infarction.  2.  Mild nonspecific white matter changes.  3.  Mild brain parenchymal volume loss. Bilateral mastoid effusions.  - Cervical Spine MRI:  1.  At the C5/C6 and C6/C7 levels, there are broad bar disc osteophyte complexes which contribute to mild spinal canal narrowing.  2.  No significant posterior disc bulge or spinal canal narrowing at any other level within the cervical spine.  3.  Multilevel uncovertebral joint disease and facet arthropathy with severe bilateral neural foraminal narrowing at C3/C4 - C6/C7.  - Thoracic Spine MRI:  1.  No evidence of signal abnormality or expansion within the thoracic spinal cord.  2.  No significant posterior disc bulge, spinal canal, or neural foraminal narrowing at any level within the thoracic spine.  -Vitamin B12 was reportedly \" always high\"    REVIEW OF SYSTEMS  All negative except for what indicated in the HPI. The following portions of the patient's history were reviewed and updated as appropriate: allergies, current medications, family history, medical history, surgical history, social history, and problem list.     MEDICATIONS    Current Outpatient Medications:      atorvastatin (LIPITOR) 10 MG tablet, Take 1 tablet by mouth daily, Disp: , Rfl:      bumetanide (BUMEX) 1 MG tablet, Take 1 tablet every day by oral route., Disp: , Rfl:      cephALEXin (KEFLEX) 500 MG capsule, Take 1 " capsule 4 times a day by oral route for 10 days., Disp: , Rfl:      cetirizine (ZYRTEC) 10 MG tablet, Take 10 mg by mouth daily, Disp: , Rfl:      CINNAMON PO, , Disp: , Rfl:      escitalopram (LEXAPRO) 20 MG tablet, 20 mg, Disp: , Rfl:      fluticasone (FLONASE) 50 MCG/ACT nasal spray, use 1 spray(s) in each nostril once daily, Disp: , Rfl:      furosemide (LASIX) 20 MG tablet, Take 1 tablet by mouth daily (Patient not taking: Reported on 4/17/2024), Disp: , Rfl:      gabapentin (NEURONTIN) 300 MG capsule, Take 3 capsules by mouth 2 times daily, Disp: , Rfl:      Glucosamine-Chondroitin 500-250 MG CAPS, Take 2 tablets by mouth daily, Disp: , Rfl:      ibuprofen (ADVIL/MOTRIN) 200 MG capsule, Take 1-2 capsules by mouth, Disp: , Rfl:      L-Lysine 1000 MG TABS, Take 1,000 mg by mouth, Disp: , Rfl:      Melatonin 10 MG CAPS, Take 20 mg by mouth, Disp: , Rfl:      montelukast (SINGULAIR) 10 MG tablet, Take 1 tablet by mouth every evening, Disp: , Rfl:      multivitamin w/minerals (MULTI-VITAMIN) tablet, Take 1 tablet by mouth daily, Disp: , Rfl:      omeprazole-sodium bicarbonate  MG CAPS per capsule, Take 1 capsule by mouth daily before breakfast, Disp: , Rfl:      Probiotic Product (SUPER PROBIOTIC) CAPS, Take 2 capsules by mouth daily, Disp: , Rfl:      propranolol (INDERAL) 20 MG tablet, Take 1 tablet by mouth 2 times daily, Disp: , Rfl:      sertraline (ZOLOFT) 100 MG tablet, Take 1 tablet every day by oral route as directed for 30 days., Disp: , Rfl:      traMADol (ULTRAM) 50 MG tablet, Take 1 tablet every 6 hours by oral route as directed for 30 days., Disp: , Rfl:      traZODone (DESYREL) 50 MG tablet, Take 1 tablet by mouth at bedtime, Disp: , Rfl:      Turmeric (QC TUMERIC COMPLEX PO), , Disp: , Rfl:      Vitamin D3 (VITAMIN D) 10 MCG (400 UNIT) tablet, daily, 0 Refill(s), Type: Maintenance (Patient not taking: Reported on 4/17/2024), Disp: , Rfl:      ZINC METHIONATE PO, Take 1 capsule by mouth daily,  Disp: , Rfl:     ALLERGIES:  No Known Allergies    PHYSICAL EXAM    NEUROLOGICAL EXAMINATION  Mental status: normal.  Speech: normal.  High arched feet: Absent  Hammertoes: present bilaterally  Coordination: normal rapid alternating movements and finger to nose testing  Gait: bilateral steppage gait, wide based, sensory ataxic  Getting up from seated position without pushing on chair: no  Posture: normal.  Romberg: yes    The Neuropathy Impairment Scoring System (NIS) strength scale was utilized.    0=normal; -1=25% weak; -2=50% weak; -3=75% weak; -3.25=movement against gravity;   -3.5=movement, gravity eliminated;-3.75=minimal contraction; -4= paralysis.  Cranial nerves   R Muscle strength L  R  L   0 Frontalis 0   Visual acuity    0 Orbicularis oculi 0  3 mm Pupils 3 mm   0 Lower facial muscles 0  0 Light reflex 0   0 Temporal-Masseter 0  0 Ptosis 0   0 Palate-Pharynx 0  0 Extraocular muscles 0   0 Sternocleidomastoid 0       0 Trapezius 0   Hearing    0 Genioglossus 0              R Muscle, strength L  R Muscle, strength L    Neck     Trunk    0 Neck flexors 0   Abdominal muscles    0 Neck extensors 0   Paraspinals     Upper Limbs    Lower Limbs    0 Supraspinatus 0  0 Iliopsoas 0   0 Infraspinatus 0  0 Adductors, thigh 0   0 Pectoralis 0  0 Gluteus medius 0   0 Deltoid 0  0 Gluteus max 0   0 Biceps brachii 0  0 Quadriceps 0   0 Brachioradialis 0  0 Hamstrings 0    Supinator/pronator   -3.75 Tibialis anterior -3.5   0 Triceps 0  -3 Peronei -3   0 Wrist extensor 0  -3.75 EHL -3.75   0 Wrist flexors 0  -3.75 Toe extensors -3.75   0 Digit extensors 0  -3.5 Tibialis post. -3.5   0/-1 Digit flexors M/U 0/-1  -3.5 Toe flexors -3.25   -2 Thenar -3  -3 Calf muscles -2   -2 Hypothenar -2       -2 Interossei -2       Bilateral calf atrophy     R Reflexes L  R Sensation L   For NIS:  Normal=0 Reduced=1 Absent=2     0 Biceps brachii 0   Finger index    -1 Brachioradialis -1  -3 Cold -3   0 Triceps 0  -3 Pinprick -3   0  Hernandez 0  0 Vibration 0   -4 Quadriceps -4   Joint position    -4 Gastroc-soleus -4   Toes (Great)    0 Clonus (ankle) 0  -4 Cold -4   Flexor Plantar response Flexor  -4 Pinprick -4     -4 Vibration -4      Joint position           ASSESSMENT / PLAN   #1 HNPP  #2 Gait and balance difficulties secondary to #1    Mr. 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 HNPP or hereditary neuropathy with liability to pressure palsies is caused by deletion of PMP22 gene. PMP22 is a major component of myelin In the peripheral nervous system. Deletion of the gene results in disrupts myelin junction causing impaired propagation of nerve action potentials and susceptible to compression. Age of onset is typically in 2nd or 3rd decades. Patients typically present with recurrent acute focal sensorimotor neuropathy especially at entrapment sites (median neuropathy at the wrist, ulnar neuropathy at the elbow, peroneal neuropathy at the knee). His motor and sensory deficits are explained by these distributions with the addition of distal weakness in the lower limbs. Typically,  symptoms from nerve entrapments recovered spontaneously after few months, however, residual weakness or numbness are not uncommon especially with repetitive compressions.  Avoiding activities that may compress the nerve such as leaning on the elbow, carrying heavy backpack around the shoulders, sitting with crossed legs are crucial to avoid future progression.  Physical therapy and Occupational Therapy are important to avoid further atrophy of the affected muscles and for gait and balance training in his case.     After discussion with Mr. Joyce, we have agreed to proceed with the following investigations and management:    Recommendations:  - PT referral for gait and balance training. He is already wearing AFOs with significant benefit. He is  interested in pool-based therapy.   - OT referral given decreased hand dexterity and significant sensory deficits in the hands.   - Follow-up in 6 months.     I spent a total of 75 minutes on the day of the visit for chart review, face-to-face visit, counseling/coordination of care, and documentation. Please see the note for further information on patient assessment and treatment.     The longitudinal plan of care for the diagnosis(es)/condition(s) as documented were addressed during this visit. Due to the added complexity in care, I will continue to support Santo in the subsequent management and with ongoing continuity of care.       PATIENT EDUCATION  Ready to learn, no apparent learning barriers were identified; learning preferences include listening.  Explained diagnosis and treatment plan; patient expressed understanding of the content.      Again, thank you for allowing me to participate in the care of your patient.        Sincerely,        Sanam Muhammad MD

## 2024-04-29 NOTE — PATIENT INSTRUCTIONS
Your symptoms, exam, EMG findings, and genetic test are consistent with HNPP. HNPP or hereditary neuropathy with liability to pressure palsies is caused by deletion of PMP22 gene. PMP22 is a major component of myelin In the peripheral nervous system. Deletion of the gene results in disrupts myelin junction causing impaired propagation of nerve action potentials and susceptible to compression. Age of onset is typically in 2nd or 3rd decades. Patients typically present with recurrent acute focal sensorimotor neuropathy especially at entrapment sites (median neuropathy at the wrist, ulnar neuropathy at the elbow, peroneal neuropathy at the knee). These episodes are typically painless.  Symptoms typically completely recovered spontaneously within few months, however, there may be some residual weakness or numbness in certain cases with repeated compressions.  Avoiding activities that may compress the nerve such as leaning on the elbow, carrying heavy backpack around the shoulders, sitting with crossed legs are crucial to avoid future attacks.  Physical therapy and Occupational Therapy are important to avoid further atrophy of the affected muscles and for gait and balance training in his case.     - OT  - PT  - Follow-up 6 months

## 2024-04-29 NOTE — PROGRESS NOTES
"CHIEF COMPLAINT / REASON FOR VISIT  Peripheral neuropathy    Referred by Dr. Raji Monroe    HISTORY OF PRESENT ILLNESS   is a 73 year old male presenting to [facility] for evaluation of peripheral neuropathy. Patient is accompanied by his significant other, who helped provide collateral history today.     He was also evaluated in [facility].  Several labs were performed.  Patient was told that he has neuropathy of uncertain etiology.    He first noticed bilateral ankle weakness about 30 years ago or even longer.  At that time, he noted that he could not do any activity that requires pedaling.  He was still able to drive.  The weakness started to affect his gait about 3 to 4 years ago.  He started tripping on his toes often and had multiple falls.  He started using a cane for years ago and switched to a walker about a year ago.  He did not notice much numbness in the feet until his gait started to be affected 4 years ago.  Around the same time, he had to notice numbness and weakness in the hands, especially in the fourth and fifth fingers, affecting hand dexterity.  He denies any associating burning or sharp pain other than dull aching shoulder pain.    His oldest daughter was also diagnosed with \"  22 disease\" when she was 18.  She has symptoms in the hands but not in the legs.  She is 48 years old and has no gait issues.  He has a total of 3 children, 2 daughters, 1 son, the son and other daughter asymptomatic.  Grandchildren are asymptomatic.    I have reviewed prior investigations as listed below:  -EMG from [facility] 11/19/2023 (Tabulated data from EMG report were personally reviewed and independently interpreted) : Right median and radial sensory sponsors were absent.  Bilateral tibial and fibular compound muscle action potentials were absent.  Right median and ulnar CMAP amplitudes were reduced with severely prolonged distal latencies and marked slowing of conduction velocity.  " "Needle EMG showed dense fibrillation potentials with myotonic like discharges in distal > proximal muscles of bilateral lower and upper limbs.  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  No evidence of acute intracranial hemorrhage, mass effect, or infarction.  2.  Mild nonspecific white matter changes.  3.  Mild brain parenchymal volume loss. Bilateral mastoid effusions.  - Cervical Spine MRI:  1.  At the C5/C6 and C6/C7 levels, there are broad bar disc osteophyte complexes which contribute to mild spinal canal narrowing.  2.  No significant posterior disc bulge or spinal canal narrowing at any other level within the cervical spine.  3.  Multilevel uncovertebral joint disease and facet arthropathy with severe bilateral neural foraminal narrowing at C3/C4 - C6/C7.  - Thoracic Spine MRI:  1.  No evidence of signal abnormality or expansion within the thoracic spinal cord.  2.  No significant posterior disc bulge, spinal canal, or neural foraminal narrowing at any level within the thoracic spine.  -Vitamin B12 was reportedly \" always high\"    REVIEW OF SYSTEMS  All negative except for what indicated in the HPI. The following portions of the patient's history were reviewed and updated as appropriate: allergies, current medications, family history, medical history, surgical history, social history, and problem list.     MEDICATIONS    Current Outpatient Medications:     atorvastatin (LIPITOR) 10 MG tablet, Take 1 tablet by mouth daily, Disp: , Rfl:     bumetanide (BUMEX) 1 MG tablet, Take 1 tablet every day by oral route., Disp: , Rfl:     cephALEXin (KEFLEX) 500 MG capsule, Take 1 capsule 4 times a day by oral route for 10 days., Disp: , Rfl:     cetirizine (ZYRTEC) 10 MG tablet, Take 10 mg by mouth daily, Disp: , Rfl:     CINNAMON PO, , Disp: , Rfl:     escitalopram (LEXAPRO) 20 MG tablet, 20 mg, Disp: , Rfl:     fluticasone (FLONASE) " 50 MCG/ACT nasal spray, use 1 spray(s) in each nostril once daily, Disp: , Rfl:     furosemide (LASIX) 20 MG tablet, Take 1 tablet by mouth daily (Patient not taking: Reported on 4/17/2024), Disp: , Rfl:     gabapentin (NEURONTIN) 300 MG capsule, Take 3 capsules by mouth 2 times daily, Disp: , Rfl:     Glucosamine-Chondroitin 500-250 MG CAPS, Take 2 tablets by mouth daily, Disp: , Rfl:     ibuprofen (ADVIL/MOTRIN) 200 MG capsule, Take 1-2 capsules by mouth, Disp: , Rfl:     L-Lysine 1000 MG TABS, Take 1,000 mg by mouth, Disp: , Rfl:     Melatonin 10 MG CAPS, Take 20 mg by mouth, Disp: , Rfl:     montelukast (SINGULAIR) 10 MG tablet, Take 1 tablet by mouth every evening, Disp: , Rfl:     multivitamin w/minerals (MULTI-VITAMIN) tablet, Take 1 tablet by mouth daily, Disp: , Rfl:     omeprazole-sodium bicarbonate  MG CAPS per capsule, Take 1 capsule by mouth daily before breakfast, Disp: , Rfl:     Probiotic Product (SUPER PROBIOTIC) CAPS, Take 2 capsules by mouth daily, Disp: , Rfl:     propranolol (INDERAL) 20 MG tablet, Take 1 tablet by mouth 2 times daily, Disp: , Rfl:     sertraline (ZOLOFT) 100 MG tablet, Take 1 tablet every day by oral route as directed for 30 days., Disp: , Rfl:     traMADol (ULTRAM) 50 MG tablet, Take 1 tablet every 6 hours by oral route as directed for 30 days., Disp: , Rfl:     traZODone (DESYREL) 50 MG tablet, Take 1 tablet by mouth at bedtime, Disp: , Rfl:     Turmeric (QC TUMERIC COMPLEX PO), , Disp: , Rfl:     Vitamin D3 (VITAMIN D) 10 MCG (400 UNIT) tablet, daily, 0 Refill(s), Type: Maintenance (Patient not taking: Reported on 4/17/2024), Disp: , Rfl:     ZINC METHIONATE PO, Take 1 capsule by mouth daily, Disp: , Rfl:     ALLERGIES:  No Known Allergies    PHYSICAL EXAM    NEUROLOGICAL EXAMINATION  Mental status: normal.  Speech: normal.  High arched feet: Absent  Hammertoes: present bilaterally  Coordination: normal rapid alternating movements and finger to nose testing  Gait:  bilateral steppage gait, wide based, sensory ataxic  Getting up from seated position without pushing on chair: no  Posture: normal.  Romberg: yes    The Neuropathy Impairment Scoring System (NIS) strength scale was utilized.    0=normal; -1=25% weak; -2=50% weak; -3=75% weak; -3.25=movement against gravity;   -3.5=movement, gravity eliminated;-3.75=minimal contraction; -4= paralysis.  Cranial nerves   R Muscle strength L  R  L   0 Frontalis 0   Visual acuity    0 Orbicularis oculi 0  3 mm Pupils 3 mm   0 Lower facial muscles 0  0 Light reflex 0   0 Temporal-Masseter 0  0 Ptosis 0   0 Palate-Pharynx 0  0 Extraocular muscles 0   0 Sternocleidomastoid 0       0 Trapezius 0   Hearing    0 Genioglossus 0              R Muscle, strength L  R Muscle, strength L    Neck     Trunk    0 Neck flexors 0   Abdominal muscles    0 Neck extensors 0   Paraspinals     Upper Limbs    Lower Limbs    0 Supraspinatus 0  0 Iliopsoas 0   0 Infraspinatus 0  0 Adductors, thigh 0   0 Pectoralis 0  0 Gluteus medius 0   0 Deltoid 0  0 Gluteus max 0   0 Biceps brachii 0  0 Quadriceps 0   0 Brachioradialis 0  0 Hamstrings 0    Supinator/pronator   -3.75 Tibialis anterior -3.5   0 Triceps 0  -3 Peronei -3   0 Wrist extensor 0  -3.75 EHL -3.75   0 Wrist flexors 0  -3.75 Toe extensors -3.75   0 Digit extensors 0  -3.5 Tibialis post. -3.5   0/-1 Digit flexors M/U 0/-1  -3.5 Toe flexors -3.25   -2 Thenar -3  -3 Calf muscles -2   -2 Hypothenar -2       -2 Interossei -2       Bilateral calf atrophy     R Reflexes L  R Sensation L   For NIS:  Normal=0 Reduced=1 Absent=2     0 Biceps brachii 0   Finger index    -1 Brachioradialis -1  -3 Cold -3   0 Triceps 0  -3 Pinprick -3   0 Hernandez 0  0 Vibration 0   -4 Quadriceps -4   Joint position    -4 Gastroc-soleus -4   Toes (Great)    0 Clonus (ankle) 0  -4 Cold -4   Flexor Plantar response Flexor  -4 Pinprick -4     -4 Vibration -4      Joint position           ASSESSMENT / PLAN   #1 HNPP  #2 Gait and balance  difficulties secondary to #1    Mr. 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 HNPP or hereditary neuropathy with liability to pressure palsies is caused by deletion of PMP22 gene. PMP22 is a major component of myelin In the peripheral nervous system. Deletion of the gene results in disrupts myelin junction causing impaired propagation of nerve action potentials and susceptible to compression. Age of onset is typically in 2nd or 3rd decades. Patients typically present with recurrent acute focal sensorimotor neuropathy especially at entrapment sites (median neuropathy at the wrist, ulnar neuropathy at the elbow, peroneal neuropathy at the knee). His motor and sensory deficits are explained by these distributions with the addition of distal weakness in the lower limbs. Typically,  symptoms from nerve entrapments recovered spontaneously after few months, however, residual weakness or numbness are not uncommon especially with repetitive compressions.  Avoiding activities that may compress the nerve such as leaning on the elbow, carrying heavy backpack around the shoulders, sitting with crossed legs are crucial to avoid future progression.  Physical therapy and Occupational Therapy are important to avoid further atrophy of the affected muscles and for gait and balance training in his case.     After discussion with Mr. Joyce, we have agreed to proceed with the following investigations and management:    Recommendations:  - PT referral for gait and balance training. He is already wearing AFOs with significant benefit. He is interested in pool-based therapy.   - OT referral given decreased hand dexterity and significant sensory deficits in the hands.   - Follow-up in 6 months.     I spent a total of 75 minutes on the day of the visit for chart review, face-to-face visit, counseling/coordination of care, and  documentation. Please see the note for further information on patient assessment and treatment.     The longitudinal plan of care for the diagnosis(es)/condition(s) as documented were addressed during this visit. Due to the added complexity in care, I will continue to support Santo in the subsequent management and with ongoing continuity of care.       PATIENT EDUCATION  Ready to learn, no apparent learning barriers were identified; learning preferences include listening.  Explained diagnosis and treatment plan; patient expressed understanding of the content.

## 2024-05-01 ENCOUNTER — TRANSFERRED RECORDS (OUTPATIENT)
Dept: HEALTH INFORMATION MANAGEMENT | Facility: CLINIC | Age: 74
End: 2024-05-01

## 2024-06-17 ENCOUNTER — TRANSFERRED RECORDS (OUTPATIENT)
Dept: HEALTH INFORMATION MANAGEMENT | Facility: CLINIC | Age: 74
End: 2024-06-17
Payer: MEDICARE

## 2024-06-17 ENCOUNTER — MEDICAL CORRESPONDENCE (OUTPATIENT)
Dept: HEALTH INFORMATION MANAGEMENT | Facility: CLINIC | Age: 74
End: 2024-06-17
Payer: MEDICARE

## 2024-06-24 ENCOUNTER — OFFICE VISIT (OUTPATIENT)
Dept: CARDIOLOGY | Facility: CLINIC | Age: 74
End: 2024-06-24
Payer: COMMERCIAL

## 2024-06-24 VITALS
RESPIRATION RATE: 16 BRPM | SYSTOLIC BLOOD PRESSURE: 125 MMHG | WEIGHT: 260 LBS | DIASTOLIC BLOOD PRESSURE: 76 MMHG | HEART RATE: 66 BPM | BODY MASS INDEX: 32.5 KG/M2

## 2024-06-24 DIAGNOSIS — G60.9 HEREDITARY AND IDIOPATHIC PERIPHERAL NEUROPATHY: ICD-10-CM

## 2024-06-24 DIAGNOSIS — E78.5 DYSLIPIDEMIA: ICD-10-CM

## 2024-06-24 DIAGNOSIS — R06.09 DOE (DYSPNEA ON EXERTION): ICD-10-CM

## 2024-06-24 DIAGNOSIS — R60.0 BILATERAL LEG EDEMA: ICD-10-CM

## 2024-06-24 DIAGNOSIS — I50.30 HEART FAILURE WITH PRESERVED EJECTION FRACTION, NYHA CLASS I (H): Primary | ICD-10-CM

## 2024-06-24 PROCEDURE — 99203 OFFICE O/P NEW LOW 30 MIN: CPT | Performed by: INTERNAL MEDICINE

## 2024-06-24 RX ORDER — SPIRONOLACTONE 25 MG/1
25 TABLET ORAL DAILY
Qty: 30 TABLET | Refills: 6 | Status: SHIPPED | OUTPATIENT
Start: 2024-06-24

## 2024-06-24 RX ORDER — ACETAMINOPHEN 325 MG/1
975 TABLET ORAL
COMMUNITY
Start: 2023-09-18

## 2024-06-24 NOTE — PROGRESS NOTES
Thank you, Dr.Tou Carlson, for asking the [facility] team to see Mr. Santo Joyce to evaluate dyspnea on exertion.    Assessment/Recommendations   Assessment:    1.  Dyspnea on exertion, likely multifactorial and due to a combination of diastolic dysfunction and cardiovascular deconditioning.  Cannot exclude occult coronary artery disease given history of hyperlipidemia as well as advanced age.  He has been on bumetanide for treatment of lower extremity edema with a BNP that was normal back in April 2024.  Does continue to have persistent lower extremity edema which may be due to venous insufficiency although cannot exclude pulmonary hypertension as his PA pressures could not be estimated on echocardiogram in November 2023 although right atrial pressure was elevated.  At this point, suggested adding spironolactone 25 mg daily to help with his lower extremity edema.  Will pursue nuclear stress testing to rule out ischemia.  Further recommendations to follow.  2.  STACEY treated with CPAP  3.  Neuropathy  4.  Dyslipidemia, on atorvastatin.      Plan:  1.  Continue current medication  2.  Add spironolactone 25 mg daily to help with peripheral edema.  Will plan to check a basic metabolic profile in 1 week.  3.  Schedule pharmacologic nuclear stress test with further recommendation to follow       History of Present Illness    Mr. Santo Joyce is a 74 year old male with history of polyneuropathy, STACEY treated with CPAP, chronic HFpEF, hyperlipidemia who has been experiencing progressive symptoms of dyspnea on exertion over the last several months.  Denies any associated chest discomfort.  No orthopnea, PND but he does have persistent lower extremity edema despite switching from furosemide to bumetanide.  Now here for further recommendation.  Reports no family history of premature CAD.  Does have a remote history of tobacco use.    ECG (personally reviewed): No ECG today    Cardiac Imaging Studies  (personally reviewed):   Narrative  Performed by PROSOLDANIEL 11/14/2023  Summary    1. Sinus rhythm during study and Frequent ectopy during study.    2. Normal LV size with mildly increased wall thickness. Visually  estimated  LVEF 55-60% with [id number]-[id number] variability due to frequent ectopy (normal  function). No regional wall motion abnormalities.Grade 1 diastolic  dysfunction.    3. Mild concentric LVH.    4. Normal RV size with normal systolic function.    5. No significant valvular abnormalities.    6. The IVC measures >2.1 cm with >50% collapse upon inspiration  consistent  with elevated right atrial pressure, 8 mmHg.    7. Unable to accurately assess PA pressure due to inadequate TR envelope.    8. Compared to the stress echocardiogram from 8/26/2022, the findings are  similar.        Physical Examination Review of Systems   /76 (BP Location: Right arm, Patient Position: Sitting, Cuff Size: Adult Large)   Pulse 66   Resp 16   Wt 117.9 kg (260 lb)   BMI 32.50 kg/m    Body mass index is 32.5 kg/m .  Wt Readings from Last 3 Encounters:   06/24/24 117.9 kg (260 lb)   04/17/24 121.7 kg (268 lb 6.4 oz)   11/17/23 118.8 kg (262 lb)     General Appearance:   Awake, Alert, No acute distress.   HEENT:  No scleral icterus; the mucous membranes were pink and moist.   Neck: No cervical bruits or jugular venous distention    Chest: The spine was straight. The chest was symmetric.   Lungs:   Respirations unlabored; the lungs are clear to auscultation. No wheezing   Cardiovascular:   Regular rate and rhythm.  S1, S2 normal.  No murmur or gallop   Abdomen:  No organomegaly, masses, bruits, or tenderness. Bowels sounds are present   Extremities: 1-2+ pitting edema to the upper calves   Skin: No xanthelasma. Warm, Dry.   Musculoskeletal: No tenderness.   Neurologic: Mood and affect are appropriate.    Enc Vitals  BP: 125/76  Pulse: 66  Resp: 16  Weight: 117.9 kg (260 lb)                                         Medical  History  Surgical History Family History Social History   Past Medical History:   Diagnosis Date    Congestive heart failure (H) 11/2023    diastolic dysfunction    Hypertension     Shortness of breath     No past surgical history on file. No family history on file. Social History     Socioeconomic History    Marital status:      Spouse name: Not on file    Number of children: Not on file    Years of education: Not on file    Highest education level: Not on file   Occupational History    Not on file   Tobacco Use    Smoking status: Former     Types: Cigarettes    Smokeless tobacco: Never   Substance and Sexual Activity    Alcohol use: Yes     Comment: 1 drink once a month    Drug use: Not on file    Sexual activity: Not on file   Other Topics Concern    Parent/sibling w/ CABG, MI or angioplasty before 65F 55M? No   Social History Narrative    Not on file     Social Determinants of Health     Financial Resource Strain: Not on file   Food Insecurity: Not on file   Transportation Needs: Not on file   Physical Activity: Not on file   Stress: Not on file   Social Connections: Not on file   Interpersonal Safety: Not on file   Housing Stability: Not on file          Medications  Allergies   Current Outpatient Medications   Medication Sig Dispense Refill    acetaminophen (TYLENOL) 325 MG tablet Take 975 mg by mouth      atorvastatin (LIPITOR) 10 MG tablet Take 1 tablet by mouth daily      bumetanide (BUMEX) 1 MG tablet Take 1 tablet every day by oral route.      cetirizine (ZYRTEC) 10 MG tablet Take 10 mg by mouth daily      CINNAMON PO       fluticasone (FLONASE) 50 MCG/ACT nasal spray use 1 spray(s) in each nostril once daily      gabapentin (NEURONTIN) 300 MG capsule Take 3 capsules by mouth 2 times daily      Glucosamine-Chondroitin 500-250 MG CAPS Take 2 tablets by mouth daily      L-Lysine 1000 MG TABS Take 1,000 mg by mouth      Melatonin 10 MG CAPS Take 20 mg by mouth      montelukast (SINGULAIR) 10 MG tablet  Take 1 tablet by mouth every evening      multivitamin w/minerals (MULTI-VITAMIN) tablet Take 1 tablet by mouth daily      omeprazole (PRILOSEC) 20 MG DR capsule Take 20 mg by mouth daily      Probiotic Product (SUPER PROBIOTIC) CAPS Take 2 capsules by mouth daily      propranolol (INDERAL) 20 MG tablet Take 1 tablet by mouth 2 times daily      sertraline (ZOLOFT) 100 MG tablet Take 1 tablet every day by oral route as directed for 30 days.      spironolactone (ALDACTONE) 25 MG tablet Take 1 tablet (25 mg) by mouth daily 30 tablet 6    traZODone (DESYREL) 50 MG tablet Take 1 tablet by mouth at bedtime      ZINC METHIONATE PO Take 1 capsule by mouth daily      albuterol (PROAIR HFA/PROVENTIL HFA/VENTOLIN HFA) 108 (90 Base) MCG/ACT inhaler Inhale 1 puff into the lungs every 4 hours as needed for shortness of breath (Patient not taking: Reported on 6/24/2024)      co-enzyme Q-10 100 MG CAPS capsule Take 300 mg by mouth daily (Patient not taking: Reported on 6/24/2024)      ibuprofen (ADVIL/MOTRIN) 200 MG capsule Take 800 mg by mouth (Patient not taking: Reported on 6/24/2024)      omeprazole-sodium bicarbonate  MG CAPS per capsule Take 1 capsule by mouth daily before breakfast (Patient not taking: Reported on 6/24/2024)      red yeast rice 600 MG CAPS Take 1,200 mg by mouth daily (Patient not taking: Reported on 6/24/2024)      Turmeric (QC TUMERIC COMPLEX PO)  (Patient not taking: Reported on 6/24/2024)      UNABLE TO FIND 850 mg MEDICATION NAME: microaelgae (Patient not taking: Reported on 6/24/2024)      UNABLE TO FIND 400 mg MEDICATION NAME: chromium vit d (Patient not taking: Reported on 6/24/2024)      UNABLE TO FIND Take 1 capsule by mouth daily MEDICATION NAME: focus factor (Patient not taking: Reported on 6/24/2024)      UNABLE TO FIND Take 300 mg by mouth daily MEDICATION NAME: Omega XL (Patient not taking: Reported on 6/24/2024)      Vitamin D3 (VITAMIN D) 10 MCG (400 UNIT) tablet  (Patient not taking:  "Reported on 6/24/2024)        No Known Allergies      Lab Results    Chemistry/lipid CBC Cardiac Enzymes/BNP/TSH/INR   No results for input(s): \"TRIG\", \"CHOLHDL\", \"LDL\", \"CREATININE\", \"BUN\", \"NA\", \"CO2\" in the last 84046 hours.    Invalid input(s): \"TOTALCHOL\", \"LDLCALC\", \"K\", \"CL\" No results for input(s): \"WBC\", \"HGB\", \"HCT\", \"MCV\", \"PLT\" in the last 92164 hours. No results for input(s): \"CKTOTAL\", \"CKMB\", \"TROPONINI\", \"BNP\", \"TSH\", \"INR\" in the last 28071 hours.    Invalid input(s): \"CKMBINDEX\"     A total of 38 minutes was spent reviewing patient's medical records, obtaining history and performing examination, as well as discussing diagnoses/ recommendations with patient and answering all questions.                      "

## 2024-06-24 NOTE — LETTER
6/24/2024    Bakari Carlson MD  [address] [facility] [address]    RE: Santo Joyce       Dear Colleague,     I had the pleasure of seeing Santo Joyce in the [facility].      Thank you, Dr.Tou Carlson, for asking the [facility] team to see Mr. Santo Joyce to evaluate dyspnea on exertion.    Assessment/Recommendations   Assessment:    1.  Dyspnea on exertion, likely multifactorial and due to a combination of diastolic dysfunction and cardiovascular deconditioning.  Cannot exclude occult coronary artery disease given history of hyperlipidemia as well as advanced age.  He has been on bumetanide for treatment of lower extremity edema with a BNP that was normal back in April 2024.  Does continue to have persistent lower extremity edema which may be due to venous insufficiency although cannot exclude pulmonary hypertension as his PA pressures could not be estimated on echocardiogram in November 2023 although right atrial pressure was elevated.  At this point, suggested adding spironolactone 25 mg daily to help with his lower extremity edema.  Will pursue nuclear stress testing to rule out ischemia.  Further recommendations to follow.  2.  STACEY treated with CPAP  3.  Neuropathy  4.  Dyslipidemia, on atorvastatin.      Plan:  1.  Continue current medication  2.  Add spironolactone 25 mg daily to help with peripheral edema.  Will plan to check a basic metabolic profile in 1 week.  3.  Schedule pharmacologic nuclear stress test with further recommendation to follow       History of Present Illness    Mr. Santo Joyce is a 74 year old male with history of polyneuropathy, STACEY treated with CPAP, chronic HFpEF, hyperlipidemia who has been experiencing progressive symptoms of dyspnea on exertion over the last several months.  Denies any associated chest discomfort.  No orthopnea, PND but he does have persistent lower extremity edema despite switching from furosemide to  bumetanide.  Now here for further recommendation.  Reports no family history of premature CAD.  Does have a remote history of tobacco use.    ECG (personally reviewed): No ECG today    Cardiac Imaging Studies (personally reviewed):   Narrative  Performed by PROSOLV 11/14/2023  Summary    1. Sinus rhythm during study and Frequent ectopy during study.    2. Normal LV size with mildly increased wall thickness. Visually  estimated  LVEF 55-60% with [id number] variability due to frequent ectopy (normal  function). No regional wall motion abnormalities.Grade 1 diastolic  dysfunction.    3. Mild concentric LVH.    4. Normal RV size with normal systolic function.    5. No significant valvular abnormalities.    6. The IVC measures >2.1 cm with >50% collapse upon inspiration  consistent  with elevated right atrial pressure, 8 mmHg.    7. Unable to accurately assess PA pressure due to inadequate TR envelope.    8. Compared to the stress echocardiogram from 8/26/2022, the findings are  similar.        Physical Examination Review of Systems   /76 (BP Location: Right arm, Patient Position: Sitting, Cuff Size: Adult Large)   Pulse 66   Resp 16   Wt 117.9 kg (260 lb)   BMI 32.50 kg/m    Body mass index is 32.5 kg/m .  Wt Readings from Last 3 Encounters:   06/24/24 117.9 kg (260 lb)   04/17/24 121.7 kg (268 lb 6.4 oz)   11/17/23 118.8 kg (262 lb)     General Appearance:   Awake, Alert, No acute distress.   HEENT:  No scleral icterus; the mucous membranes were pink and moist.   Neck: No cervical bruits or jugular venous distention    Chest: The spine was straight. The chest was symmetric.   Lungs:   Respirations unlabored; the lungs are clear to auscultation. No wheezing   Cardiovascular:   Regular rate and rhythm.  S1, S2 normal.  No murmur or gallop   Abdomen:  No organomegaly, masses, bruits, or tenderness. Bowels sounds are present   Extremities: 1-2+ pitting edema to the upper calves   Skin: No xanthelasma. Warm,  Dry.   Musculoskeletal: No tenderness.   Neurologic: Mood and affect are appropriate.    Enc Vitals  BP: 125/76  Pulse: 66  Resp: 16  Weight: 117.9 kg (260 lb)                                         Medical History  Surgical History Family History Social History   Past Medical History:   Diagnosis Date    Congestive heart failure (H) 11/2023    diastolic dysfunction    Hypertension     Shortness of breath     No past surgical history on file. No family history on file. Social History     Socioeconomic History    Marital status:      Spouse name: Not on file    Number of children: Not on file    Years of education: Not on file    Highest education level: Not on file   Occupational History    Not on file   Tobacco Use    Smoking status: Former     Types: Cigarettes    Smokeless tobacco: Never   Substance and Sexual Activity    Alcohol use: Yes     Comment: 1 drink once a month    Drug use: Not on file    Sexual activity: Not on file   Other Topics Concern    Parent/sibling w/ CABG, MI or angioplasty before 65F 55M? No   Social History Narrative    Not on file     Social Determinants of Health     Financial Resource Strain: Not on file   Food Insecurity: Not on file   Transportation Needs: Not on file   Physical Activity: Not on file   Stress: Not on file   Social Connections: Not on file   Interpersonal Safety: Not on file   Housing Stability: Not on file          Medications  Allergies   Current Outpatient Medications   Medication Sig Dispense Refill    acetaminophen (TYLENOL) 325 MG tablet Take 975 mg by mouth      atorvastatin (LIPITOR) 10 MG tablet Take 1 tablet by mouth daily      bumetanide (BUMEX) 1 MG tablet Take 1 tablet every day by oral route.      cetirizine (ZYRTEC) 10 MG tablet Take 10 mg by mouth daily      CINNAMON PO       fluticasone (FLONASE) 50 MCG/ACT nasal spray use 1 spray(s) in each nostril once daily      gabapentin (NEURONTIN) 300 MG capsule Take 3 capsules by mouth 2 times daily       Glucosamine-Chondroitin 500-250 MG CAPS Take 2 tablets by mouth daily      L-Lysine 1000 MG TABS Take 1,000 mg by mouth      Melatonin 10 MG CAPS Take 20 mg by mouth      montelukast (SINGULAIR) 10 MG tablet Take 1 tablet by mouth every evening      multivitamin w/minerals (MULTI-VITAMIN) tablet Take 1 tablet by mouth daily      omeprazole (PRILOSEC) 20 MG DR capsule Take 20 mg by mouth daily      Probiotic Product (SUPER PROBIOTIC) CAPS Take 2 capsules by mouth daily      propranolol (INDERAL) 20 MG tablet Take 1 tablet by mouth 2 times daily      sertraline (ZOLOFT) 100 MG tablet Take 1 tablet every day by oral route as directed for 30 days.      spironolactone (ALDACTONE) 25 MG tablet Take 1 tablet (25 mg) by mouth daily 30 tablet 6    traZODone (DESYREL) 50 MG tablet Take 1 tablet by mouth at bedtime      ZINC METHIONATE PO Take 1 capsule by mouth daily      albuterol (PROAIR HFA/PROVENTIL HFA/VENTOLIN HFA) 108 (90 Base) MCG/ACT inhaler Inhale 1 puff into the lungs every 4 hours as needed for shortness of breath (Patient not taking: Reported on 6/24/2024)      co-enzyme Q-10 100 MG CAPS capsule Take 300 mg by mouth daily (Patient not taking: Reported on 6/24/2024)      ibuprofen (ADVIL/MOTRIN) 200 MG capsule Take 800 mg by mouth (Patient not taking: Reported on 6/24/2024)      omeprazole-sodium bicarbonate  MG CAPS per capsule Take 1 capsule by mouth daily before breakfast (Patient not taking: Reported on 6/24/2024)      red yeast rice 600 MG CAPS Take 1,200 mg by mouth daily (Patient not taking: Reported on 6/24/2024)      Turmeric (QC TUMERIC COMPLEX PO)  (Patient not taking: Reported on 6/24/2024)      UNABLE TO FIND 850 mg MEDICATION NAME: microaelgae (Patient not taking: Reported on 6/24/2024)      UNABLE TO FIND 400 mg MEDICATION NAME: chromium vit d (Patient not taking: Reported on 6/24/2024)      UNABLE TO FIND Take 1 capsule by mouth daily MEDICATION NAME: focus factor (Patient not taking:  "Reported on 6/24/2024)      UNABLE TO FIND Take 300 mg by mouth daily MEDICATION NAME: Omega XL (Patient not taking: Reported on 6/24/2024)      Vitamin D3 (VITAMIN D) 10 MCG (400 UNIT) tablet  (Patient not taking: Reported on 6/24/2024)        No Known Allergies      Lab Results    Chemistry/lipid CBC Cardiac Enzymes/BNP/TSH/INR   No results for input(s): \"TRIG\", \"CHOLHDL\", \"LDL\", \"CREATININE\", \"BUN\", \"NA\", \"CO2\" in the last 30980 hours.    Invalid input(s): \"TOTALCHOL\", \"LDLCALC\", \"K\", \"CL\" No results for input(s): \"WBC\", \"HGB\", \"HCT\", \"MCV\", \"PLT\" in the last 03330 hours. No results for input(s): \"CKTOTAL\", \"CKMB\", \"TROPONINI\", \"BNP\", \"TSH\", \"INR\" in the last 31311 hours.    Invalid input(s): \"CKMBINDEX\"     A total of 38 minutes was spent reviewing patient's medical records, obtaining history and performing examination, as well as discussing diagnoses/ recommendations with patient and answering all questions.                    Thank you for allowing me to participate in the care of your patient.      Sincerely,     Desi Cao MD     [facility]  cc:   Referred Self,     "

## 2024-06-24 NOTE — PATIENT INSTRUCTIONS
Continue current medications  Begin spironolactone 25 mg daily. Will have you get some blood work in 1 week to check electrolytes  Set up nuclear stress test at [facility]. Further recommendations to follow.

## 2024-07-01 ENCOUNTER — TELEPHONE (OUTPATIENT)
Dept: NEUROLOGY | Facility: CLINIC | Age: 74
End: 2024-07-01
Payer: COMMERCIAL

## 2024-07-02 ENCOUNTER — HOSPITAL ENCOUNTER (OUTPATIENT)
Dept: NUCLEAR MEDICINE | Facility: HOSPITAL | Age: 74
Discharge: HOME | End: 2024-07-02
Attending: INTERNAL MEDICINE
Payer: MEDICARE

## 2024-07-02 ENCOUNTER — HOSPITAL ENCOUNTER (OUTPATIENT)
Dept: CARDIOLOGY | Facility: HOSPITAL | Age: 74
Discharge: HOME | End: 2024-07-02
Attending: INTERNAL MEDICINE
Payer: MEDICARE

## 2024-07-02 DIAGNOSIS — R06.09 DOE (DYSPNEA ON EXERTION): ICD-10-CM

## 2024-07-02 DIAGNOSIS — I50.30 HEART FAILURE WITH PRESERVED EJECTION FRACTION, NYHA CLASS I (H): ICD-10-CM

## 2024-07-02 LAB — STRESS ECHO TARGET HR: 146

## 2024-07-02 PROCEDURE — 93018 CV STRESS TEST I&R ONLY: CPT | Performed by: STUDENT IN AN ORGANIZED HEALTH CARE EDUCATION/TRAINING PROGRAM

## 2024-07-02 PROCEDURE — A9500 TC99M SESTAMIBI: HCPCS | Performed by: INTERNAL MEDICINE

## 2024-07-02 PROCEDURE — G1010 CDSM STANSON: HCPCS | Performed by: STUDENT IN AN ORGANIZED HEALTH CARE EDUCATION/TRAINING PROGRAM

## 2024-07-02 PROCEDURE — 343N000001 HC RX 343: Performed by: INTERNAL MEDICINE

## 2024-07-02 PROCEDURE — 250N000011 HC RX IP 250 OP 636: Performed by: INTERNAL MEDICINE

## 2024-07-02 PROCEDURE — 93017 CV STRESS TEST TRACING ONLY: CPT

## 2024-07-02 PROCEDURE — 78452 HT MUSCLE IMAGE SPECT MULT: CPT | Mod: ME

## 2024-07-02 PROCEDURE — 78452 HT MUSCLE IMAGE SPECT MULT: CPT | Mod: 26 | Performed by: STUDENT IN AN ORGANIZED HEALTH CARE EDUCATION/TRAINING PROGRAM

## 2024-07-02 PROCEDURE — 93016 CV STRESS TEST SUPVJ ONLY: CPT | Performed by: INTERNAL MEDICINE

## 2024-07-02 RX ORDER — REGADENOSON 0.08 MG/ML
0.4 INJECTION, SOLUTION INTRAVENOUS ONCE
Status: COMPLETED | OUTPATIENT
Start: 2024-07-02 | End: 2024-07-02

## 2024-07-02 RX ORDER — AMINOPHYLLINE 25 MG/ML
50-100 INJECTION, SOLUTION INTRAVENOUS
Status: DISCONTINUED | OUTPATIENT
Start: 2024-07-02 | End: 2024-07-03 | Stop reason: HOSPADM

## 2024-07-02 RX ORDER — NITROGLYCERIN 0.4 MG/1
0.4 TABLET SUBLINGUAL EVERY 5 MIN PRN
Status: DISCONTINUED | OUTPATIENT
Start: 2024-07-02 | End: 2024-07-02 | Stop reason: HOSPADM

## 2024-07-02 RX ORDER — ALBUTEROL SULFATE 90 UG/1
2 AEROSOL, METERED RESPIRATORY (INHALATION) EVERY 5 MIN PRN
Status: DISCONTINUED | OUTPATIENT
Start: 2024-07-02 | End: 2024-07-03 | Stop reason: HOSPADM

## 2024-07-02 RX ADMIN — Medication 44.5 MILLICURIE: at 11:00

## 2024-07-02 RX ADMIN — Medication 10.7 MILLICURIE: at 08:54

## 2024-07-02 RX ADMIN — REGADENOSON 0.4 MG: 0.08 INJECTION, SOLUTION INTRAVENOUS at 09:58

## 2024-07-15 ENCOUNTER — TELEPHONE (OUTPATIENT)
Dept: CARDIOLOGY | Facility: CLINIC | Age: 74
End: 2024-07-15
Payer: COMMERCIAL

## 2024-07-15 DIAGNOSIS — E78.5 DYSLIPIDEMIA: ICD-10-CM

## 2024-07-15 DIAGNOSIS — I50.30 HEART FAILURE WITH PRESERVED EJECTION FRACTION, NYHA CLASS I (H): Primary | ICD-10-CM

## 2024-07-15 NOTE — TELEPHONE ENCOUNTER
Msg rec'd 7-15-24 @ 1245:  Desi Cao MD Gorshe, Maureen, RN  3 months.  KML    Follow-up order placed per protocol - msg sent to sched team.  mg

## 2024-07-15 NOTE — TELEPHONE ENCOUNTER
Msg rec'd 7-14-24 @ 1401:  Desi Cao MD Gorshe, Maureen, RN    Let Josh know that his repeat labs look good following initiation of spironolactone.    KML    MyChart msg sent to patient.  mg

## 2024-07-15 NOTE — PROGRESS NOTES
Msg rec'd 7-14-24 @ 1401:  Desi Cao MD Gorshe, Maureen, RN  Let Josh know that his repeat labs look good following initiation of spironolactone.  KML    Refer to 7-15-24 phone note for follow-up.  mg

## 2025-01-19 ENCOUNTER — HEALTH MAINTENANCE LETTER (OUTPATIENT)
Age: 75
End: 2025-01-19

## 2025-04-22 DIAGNOSIS — I50.30 HEART FAILURE WITH PRESERVED EJECTION FRACTION, NYHA CLASS I (H): ICD-10-CM

## 2025-04-22 DIAGNOSIS — R06.09 DOE (DYSPNEA ON EXERTION): ICD-10-CM

## 2025-04-23 RX ORDER — SPIRONOLACTONE 25 MG/1
25 TABLET ORAL DAILY
Qty: 90 TABLET | Refills: 0 | Status: SHIPPED | OUTPATIENT
Start: 2025-04-23